# Patient Record
Sex: MALE | Race: WHITE | NOT HISPANIC OR LATINO
[De-identification: names, ages, dates, MRNs, and addresses within clinical notes are randomized per-mention and may not be internally consistent; named-entity substitution may affect disease eponyms.]

---

## 2017-01-13 ENCOUNTER — MEDICATION RENEWAL (OUTPATIENT)
Age: 72
End: 2017-01-13

## 2017-09-28 PROBLEM — N18.3 CKD (CHRONIC KIDNEY DISEASE), STAGE III: Status: RESOLVED | Noted: 2017-09-28 | Resolved: 2017-09-28

## 2017-09-28 PROBLEM — R35.1 NOCTURIA MORE THAN TWICE PER NIGHT: Status: RESOLVED | Noted: 2017-09-28 | Resolved: 2017-09-28

## 2017-09-28 PROBLEM — Z86.39 HISTORY OF HYPERLIPIDEMIA: Status: RESOLVED | Noted: 2017-09-28 | Resolved: 2017-09-28

## 2017-09-28 PROBLEM — Z95.5 PRESENCE OF DRUG COATED STENT IN RIGHT CORONARY ARTERY: Status: ACTIVE | Noted: 2017-09-28

## 2017-09-28 PROBLEM — Z82.49 FAMILY HISTORY OF CARDIAC DISORDER: Status: ACTIVE | Noted: 2017-09-28

## 2017-09-28 PROBLEM — Z87.891 FORMER SMOKER: Status: ACTIVE | Noted: 2017-09-28

## 2017-09-28 PROBLEM — Z87.19 HISTORY OF GASTROESOPHAGEAL REFLUX (GERD): Status: RESOLVED | Noted: 2017-09-28 | Resolved: 2017-09-28

## 2017-09-28 PROBLEM — Z80.9 FAMILY HISTORY OF MALIGNANT NEOPLASM: Status: ACTIVE | Noted: 2017-09-28

## 2017-10-02 ENCOUNTER — FORM ENCOUNTER (OUTPATIENT)
Age: 72
End: 2017-10-02

## 2017-10-03 ENCOUNTER — OUTPATIENT (OUTPATIENT)
Dept: OUTPATIENT SERVICES | Facility: HOSPITAL | Age: 72
LOS: 1 days | End: 2017-10-03
Payer: MEDICARE

## 2017-10-03 ENCOUNTER — APPOINTMENT (OUTPATIENT)
Dept: HEART AND VASCULAR | Facility: CLINIC | Age: 72
End: 2017-10-03
Payer: MEDICARE

## 2017-10-03 ENCOUNTER — APPOINTMENT (OUTPATIENT)
Dept: CARDIOTHORACIC SURGERY | Facility: CLINIC | Age: 72
End: 2017-10-03
Payer: MEDICARE

## 2017-10-03 VITALS — DIASTOLIC BLOOD PRESSURE: 76 MMHG | OXYGEN SATURATION: 97 % | SYSTOLIC BLOOD PRESSURE: 129 MMHG | HEART RATE: 65 BPM

## 2017-10-03 VITALS
HEART RATE: 65 BPM | HEIGHT: 71 IN | TEMPERATURE: 98 F | OXYGEN SATURATION: 97 % | RESPIRATION RATE: 16 BRPM | DIASTOLIC BLOOD PRESSURE: 76 MMHG | WEIGHT: 190.26 LBS | SYSTOLIC BLOOD PRESSURE: 129 MMHG

## 2017-10-03 VITALS
RESPIRATION RATE: 18 BRPM | TEMPERATURE: 98.1 F | BODY MASS INDEX: 26.6 KG/M2 | SYSTOLIC BLOOD PRESSURE: 125 MMHG | HEIGHT: 71 IN | DIASTOLIC BLOOD PRESSURE: 65 MMHG | OXYGEN SATURATION: 97 % | HEART RATE: 78 BPM | WEIGHT: 190 LBS

## 2017-10-03 DIAGNOSIS — N18.3 CHRONIC KIDNEY DISEASE, STAGE 3 (MODERATE): ICD-10-CM

## 2017-10-03 DIAGNOSIS — R35.1 NOCTURIA: ICD-10-CM

## 2017-10-03 DIAGNOSIS — T82.897A OTHER SPECIFIED COMPLICATION OF CARDIAC PROSTHETIC DEVICES, IMPLANTS AND GRAFTS, INITIAL ENCOUNTER: ICD-10-CM

## 2017-10-03 DIAGNOSIS — Z82.49 FAMILY HISTORY OF ISCHEMIC HEART DISEASE AND OTHER DISEASES OF THE CIRCULATORY SYSTEM: ICD-10-CM

## 2017-10-03 DIAGNOSIS — Z80.9 FAMILY HISTORY OF MALIGNANT NEOPLASM, UNSPECIFIED: ICD-10-CM

## 2017-10-03 DIAGNOSIS — Z87.891 PERSONAL HISTORY OF NICOTINE DEPENDENCE: ICD-10-CM

## 2017-10-03 DIAGNOSIS — Z87.19 PERSONAL HISTORY OF OTHER DISEASES OF THE DIGESTIVE SYSTEM: ICD-10-CM

## 2017-10-03 DIAGNOSIS — Z86.39 PERSONAL HISTORY OF OTHER ENDOCRINE, NUTRITIONAL AND METABOLIC DISEASE: ICD-10-CM

## 2017-10-03 DIAGNOSIS — Z95.5 PRESENCE OF CORONARY ANGIOPLASTY IMPLANT AND GRAFT: ICD-10-CM

## 2017-10-03 LAB
ALBUMIN SERPL ELPH-MCNC: 4.4 G/DL — SIGNIFICANT CHANGE UP (ref 3.3–5)
ALP SERPL-CCNC: 52 U/L — SIGNIFICANT CHANGE UP (ref 40–120)
ALT FLD-CCNC: 26 U/L — SIGNIFICANT CHANGE UP (ref 10–45)
ANION GAP SERPL CALC-SCNC: 14 MMOL/L — SIGNIFICANT CHANGE UP (ref 5–17)
APPEARANCE UR: CLEAR — SIGNIFICANT CHANGE UP
APTT BLD: 36.9 SEC — SIGNIFICANT CHANGE UP (ref 27.5–37.4)
AST SERPL-CCNC: 32 U/L — SIGNIFICANT CHANGE UP (ref 10–40)
BACTERIA # UR AUTO: PRESENT /HPF
BASOPHILS NFR BLD AUTO: 0.2 % — SIGNIFICANT CHANGE UP (ref 0–2)
BILIRUB SERPL-MCNC: 0.6 MG/DL — SIGNIFICANT CHANGE UP (ref 0.2–1.2)
BILIRUB UR-MCNC: NEGATIVE — SIGNIFICANT CHANGE UP
BUN SERPL-MCNC: 26 MG/DL — HIGH (ref 7–23)
CALCIUM SERPL-MCNC: 9.7 MG/DL — SIGNIFICANT CHANGE UP (ref 8.4–10.5)
CHLORIDE SERPL-SCNC: 104 MMOL/L — SIGNIFICANT CHANGE UP (ref 96–108)
CHOLEST SERPL-MCNC: 139 MG/DL — SIGNIFICANT CHANGE UP (ref 10–199)
CO2 SERPL-SCNC: 25 MMOL/L — SIGNIFICANT CHANGE UP (ref 22–31)
COLOR SPEC: YELLOW — SIGNIFICANT CHANGE UP
CREAT SERPL-MCNC: 1.15 MG/DL — SIGNIFICANT CHANGE UP (ref 0.5–1.3)
DIFF PNL FLD: (no result)
EOSINOPHIL NFR BLD AUTO: 1.5 % — SIGNIFICANT CHANGE UP (ref 0–6)
GLUCOSE SERPL-MCNC: 91 MG/DL — SIGNIFICANT CHANGE UP (ref 70–99)
GLUCOSE UR QL: NEGATIVE — SIGNIFICANT CHANGE UP
HBA1C BLD-MCNC: 5 % — SIGNIFICANT CHANGE UP (ref 4–5.6)
HBV SURFACE AG SER-ACNC: SIGNIFICANT CHANGE UP
HCT VFR BLD CALC: 40.3 % — SIGNIFICANT CHANGE UP (ref 39–50)
HDLC SERPL-MCNC: 53 MG/DL — SIGNIFICANT CHANGE UP (ref 40–125)
HGB BLD-MCNC: 13.2 G/DL — SIGNIFICANT CHANGE UP (ref 13–17)
INR BLD: 1.17 — HIGH (ref 0.88–1.16)
KETONES UR-MCNC: NEGATIVE — SIGNIFICANT CHANGE UP
LEUKOCYTE ESTERASE UR-ACNC: NEGATIVE — SIGNIFICANT CHANGE UP
LIPID PNL WITH DIRECT LDL SERPL: 64 MG/DL — SIGNIFICANT CHANGE UP
LYMPHOCYTES # BLD AUTO: 25.4 % — SIGNIFICANT CHANGE UP (ref 13–44)
MCHC RBC-ENTMCNC: 31.1 PG — SIGNIFICANT CHANGE UP (ref 27–34)
MCHC RBC-ENTMCNC: 32.8 G/DL — SIGNIFICANT CHANGE UP (ref 32–36)
MCV RBC AUTO: 95 FL — SIGNIFICANT CHANGE UP (ref 80–100)
MONOCYTES NFR BLD AUTO: 9.8 % — SIGNIFICANT CHANGE UP (ref 2–14)
NEUTROPHILS NFR BLD AUTO: 63.1 % — SIGNIFICANT CHANGE UP (ref 43–77)
NITRITE UR-MCNC: NEGATIVE — SIGNIFICANT CHANGE UP
PH UR: 5.5 — SIGNIFICANT CHANGE UP (ref 5–8)
PLATELET # BLD AUTO: 168 K/UL — SIGNIFICANT CHANGE UP (ref 150–400)
POTASSIUM SERPL-MCNC: 4.2 MMOL/L — SIGNIFICANT CHANGE UP (ref 3.5–5.3)
POTASSIUM SERPL-SCNC: 4.2 MMOL/L — SIGNIFICANT CHANGE UP (ref 3.5–5.3)
PROT SERPL-MCNC: 7.5 G/DL — SIGNIFICANT CHANGE UP (ref 6–8.3)
PROT UR-MCNC: NEGATIVE MG/DL — SIGNIFICANT CHANGE UP
PROTHROM AB SERPL-ACNC: 13 SEC — HIGH (ref 9.8–12.7)
RBC # BLD: 4.24 M/UL — SIGNIFICANT CHANGE UP (ref 4.2–5.8)
RBC # FLD: 13.7 % — SIGNIFICANT CHANGE UP (ref 10.3–16.9)
RBC CASTS # UR COMP ASSIST: (no result) /HPF
SODIUM SERPL-SCNC: 143 MMOL/L — SIGNIFICANT CHANGE UP (ref 135–145)
SP GR SPEC: 1.02 — SIGNIFICANT CHANGE UP (ref 1–1.03)
TOTAL CHOLESTEROL/HDL RATIO MEASUREMENT: 2.6 RATIO — LOW (ref 3.4–9.6)
TRIGL SERPL-MCNC: 108 MG/DL — SIGNIFICANT CHANGE UP (ref 10–149)
TSH SERPL-MCNC: 0.72 UIU/ML — SIGNIFICANT CHANGE UP (ref 0.35–4.94)
UROBILINOGEN FLD QL: 0.2 E.U./DL — SIGNIFICANT CHANGE UP
WBC # BLD: 5.9 K/UL — SIGNIFICANT CHANGE UP (ref 3.8–10.5)
WBC # FLD AUTO: 5.9 K/UL — SIGNIFICANT CHANGE UP (ref 3.8–10.5)
WBC UR QL: < 5 /HPF — SIGNIFICANT CHANGE UP

## 2017-10-03 PROCEDURE — 84443 ASSAY THYROID STIM HORMONE: CPT

## 2017-10-03 PROCEDURE — 93000 ELECTROCARDIOGRAM COMPLETE: CPT

## 2017-10-03 PROCEDURE — 71020: CPT | Mod: 26

## 2017-10-03 PROCEDURE — 85730 THROMBOPLASTIN TIME PARTIAL: CPT

## 2017-10-03 PROCEDURE — 86901 BLOOD TYPING SEROLOGIC RH(D): CPT

## 2017-10-03 PROCEDURE — 87340 HEPATITIS B SURFACE AG IA: CPT

## 2017-10-03 PROCEDURE — 86850 RBC ANTIBODY SCREEN: CPT

## 2017-10-03 PROCEDURE — 83036 HEMOGLOBIN GLYCOSYLATED A1C: CPT

## 2017-10-03 PROCEDURE — 81001 URINALYSIS AUTO W/SCOPE: CPT

## 2017-10-03 PROCEDURE — 80061 LIPID PANEL: CPT

## 2017-10-03 PROCEDURE — 85610 PROTHROMBIN TIME: CPT

## 2017-10-03 PROCEDURE — 86900 BLOOD TYPING SEROLOGIC ABO: CPT

## 2017-10-03 PROCEDURE — 85025 COMPLETE CBC W/AUTO DIFF WBC: CPT

## 2017-10-03 PROCEDURE — 99204 OFFICE O/P NEW MOD 45 MIN: CPT | Mod: 25

## 2017-10-03 PROCEDURE — 71046 X-RAY EXAM CHEST 2 VIEWS: CPT

## 2017-10-03 PROCEDURE — 80053 COMPREHEN METABOLIC PANEL: CPT

## 2017-10-03 PROCEDURE — 99205 OFFICE O/P NEW HI 60 MIN: CPT

## 2017-10-03 NOTE — H&P ADULT - PMH
CAD (Coronary Artery Disease)    Carpal Tunnel Syndrome  right and left  CKD (chronic kidney disease) stage 2, GFR 60-89 ml/min    GERD (gastroesophageal reflux disease)    HLD (hyperlipidemia)    HTN (Hypertension)    Prostate cancer    Stented coronary artery

## 2017-10-03 NOTE — H&P ADULT - HISTORY OF PRESENT ILLNESS
70 yo male with a history of HTN, HLD, prostate CA, CKD stage 2-3 and CAD s/p PCI to RCA (2006/2011) presented to his cardiologist with c/o severe chest discomfort and sob/dyspnea with activity. He described the left sided chest pain/pressure similar to prior to stent in 2011. In January 2017he had unrevealing nuclear stress test. 9/20/17 cardiac cath revealed patent RCA stents, distal left main stenosis, normal LV function. He is retired and lives in [place] and is referred to Dr. Parham by Vicente Uribe. He was seen in the outpatient office by Dr. Parham and now presents for elective surgery 72 yo male with a history of HTN, HLD, prostate CA, CKD stage 2-3 and CAD s/p PCI to RCA (2006/2011) presented to his cardiologist with c/o severe chest discomfort and sob/dyspnea with activity. He described the left sided chest pain/pressure similar to prior to stent in 2011. In January 2017he had unrevealing nuclear stress test. 9/20/17 cardiac cath revealed patent RCA stents, distal left main stenosis, normal LV function. He is retired and lives in [place] and is referred to Dr. Parham by Vicente Uribe. He was seen in the outpatient office by Dr. Parham and now presents for elective surgery.  On morning of surgery patient is in his usual state of health.

## 2017-10-03 NOTE — H&P ADULT - PSH
Bilateral Inguinal Hernia  repair 2005  Coronary Artery Stent Placement  6/26/06  [facility].  Drug eluting stent  RCA  S/P Carpal Tunnel Release  right and left 2007

## 2017-10-03 NOTE — H&P ADULT - NSHPLABSRESULTS_GEN_ALL_CORE
Hgb A1C =5.0  creat = 1.15  hct= 40.3  hgb=13.2  plt= 168  WBC= 5.9  INR= 1.17  tot alb= 4.4  tot bili= 0.6    TSH= 0.724    10/3/17 Chest xray: clear lungs    10/3/17 EKG: SB, 59 bpm    9/19/17 Cardiac Cath: widely patent RCA stents, distal left main stenosis, , EF 55%

## 2017-10-05 ENCOUNTER — APPOINTMENT (OUTPATIENT)
Dept: CARDIOTHORACIC SURGERY | Facility: HOSPITAL | Age: 72
End: 2017-10-05

## 2017-10-05 ENCOUNTER — INPATIENT (INPATIENT)
Facility: HOSPITAL | Age: 72
LOS: 4 days | Discharge: HOME HEALTH | DRG: 232 | End: 2017-10-10
Attending: THORACIC SURGERY (CARDIOTHORACIC VASCULAR SURGERY) | Admitting: THORACIC SURGERY (CARDIOTHORACIC VASCULAR SURGERY)
Payer: MEDICARE

## 2017-10-05 DIAGNOSIS — R33.9 RETENTION OF URINE, UNSPECIFIED: ICD-10-CM

## 2017-10-05 DIAGNOSIS — N20.0 CALCULUS OF KIDNEY: ICD-10-CM

## 2017-10-05 DIAGNOSIS — J90 PLEURAL EFFUSION, NOT ELSEWHERE CLASSIFIED: ICD-10-CM

## 2017-10-05 DIAGNOSIS — Z09 ENCOUNTER FOR FOLLOW-UP EXAMINATION AFTER COMPLETED TREATMENT FOR CONDITIONS OTHER THAN MALIGNANT NEOPLASM: ICD-10-CM

## 2017-10-05 DIAGNOSIS — R31.29 OTHER MICROSCOPIC HEMATURIA: ICD-10-CM

## 2017-10-05 DIAGNOSIS — Z98.61 CORONARY ANGIOPLASTY STATUS: ICD-10-CM

## 2017-10-05 DIAGNOSIS — Z95.1 PRESENCE OF AORTOCORONARY BYPASS GRAFT: ICD-10-CM

## 2017-10-05 DIAGNOSIS — Z87.898 PERSONAL HISTORY OF OTHER SPECIFIED CONDITIONS: ICD-10-CM

## 2017-10-05 DIAGNOSIS — Z85.46 PERSONAL HISTORY OF MALIGNANT NEOPLASM OF PROSTATE: ICD-10-CM

## 2017-10-05 LAB
ALBUMIN SERPL ELPH-MCNC: 3.8 G/DL — SIGNIFICANT CHANGE UP (ref 3.3–5)
ALBUMIN SERPL ELPH-MCNC: 4.2 G/DL — SIGNIFICANT CHANGE UP (ref 3.3–5)
ALP SERPL-CCNC: 45 U/L — SIGNIFICANT CHANGE UP (ref 40–120)
ALP SERPL-CCNC: 49 U/L — SIGNIFICANT CHANGE UP (ref 40–120)
ALT FLD-CCNC: 23 U/L — SIGNIFICANT CHANGE UP (ref 10–45)
ALT FLD-CCNC: 26 U/L — SIGNIFICANT CHANGE UP (ref 10–45)
ANION GAP SERPL CALC-SCNC: 12 MMOL/L — SIGNIFICANT CHANGE UP (ref 5–17)
ANION GAP SERPL CALC-SCNC: 14 MMOL/L — SIGNIFICANT CHANGE UP (ref 5–17)
ANION GAP SERPL CALC-SCNC: 16 MMOL/L — SIGNIFICANT CHANGE UP (ref 5–17)
APTT BLD: 26.6 SEC — LOW (ref 27.5–37.4)
APTT BLD: 28 SEC — SIGNIFICANT CHANGE UP (ref 27.5–37.4)
APTT BLD: 30.1 SEC — SIGNIFICANT CHANGE UP (ref 27.5–37.4)
AST SERPL-CCNC: 34 U/L — SIGNIFICANT CHANGE UP (ref 10–40)
AST SERPL-CCNC: 36 U/L — SIGNIFICANT CHANGE UP (ref 10–40)
BASE EXCESS BLDA CALC-SCNC: -1.3 MMOL/L — SIGNIFICANT CHANGE UP (ref -2–3)
BASE EXCESS BLDA CALC-SCNC: -1.4 MMOL/L — SIGNIFICANT CHANGE UP (ref -2–3)
BASE EXCESS BLDV CALC-SCNC: 3 MMOL/L — SIGNIFICANT CHANGE UP
BASOPHILS NFR BLD AUTO: 0 % — SIGNIFICANT CHANGE UP (ref 0–2)
BILIRUB DIRECT SERPL-MCNC: <0.2 MG/DL — SIGNIFICANT CHANGE UP (ref 0–0.2)
BILIRUB INDIRECT FLD-MCNC: >0.4 MG/DL — SIGNIFICANT CHANGE UP (ref 0.2–1)
BILIRUB SERPL-MCNC: 0.5 MG/DL — SIGNIFICANT CHANGE UP (ref 0.2–1.2)
BILIRUB SERPL-MCNC: 0.6 MG/DL — SIGNIFICANT CHANGE UP (ref 0.2–1.2)
BLD GP AB SCN SERPL QL: NEGATIVE — SIGNIFICANT CHANGE UP
BUN SERPL-MCNC: 12 MG/DL — SIGNIFICANT CHANGE UP (ref 7–23)
BUN SERPL-MCNC: 14 MG/DL — SIGNIFICANT CHANGE UP (ref 7–23)
BUN SERPL-MCNC: 16 MG/DL — SIGNIFICANT CHANGE UP (ref 7–23)
CA-I BLDA-SCNC: 1.18 MMOL/L — SIGNIFICANT CHANGE UP (ref 1.12–1.3)
CALCIUM SERPL-MCNC: 8.8 MG/DL — SIGNIFICANT CHANGE UP (ref 8.4–10.5)
CALCIUM SERPL-MCNC: 9.3 MG/DL — SIGNIFICANT CHANGE UP (ref 8.4–10.5)
CALCIUM SERPL-MCNC: 9.9 MG/DL — SIGNIFICANT CHANGE UP (ref 8.4–10.5)
CHLORIDE SERPL-SCNC: 102 MMOL/L — SIGNIFICANT CHANGE UP (ref 96–108)
CHLORIDE SERPL-SCNC: 98 MMOL/L — SIGNIFICANT CHANGE UP (ref 96–108)
CHLORIDE SERPL-SCNC: 99 MMOL/L — SIGNIFICANT CHANGE UP (ref 96–108)
CO2 SERPL-SCNC: 23 MMOL/L — SIGNIFICANT CHANGE UP (ref 22–31)
CO2 SERPL-SCNC: 23 MMOL/L — SIGNIFICANT CHANGE UP (ref 22–31)
CO2 SERPL-SCNC: 24 MMOL/L — SIGNIFICANT CHANGE UP (ref 22–31)
COHGB MFR BLDA: 1 % — SIGNIFICANT CHANGE UP
CREAT SERPL-MCNC: 0.83 MG/DL — SIGNIFICANT CHANGE UP (ref 0.5–1.3)
CREAT SERPL-MCNC: 0.85 MG/DL — SIGNIFICANT CHANGE UP (ref 0.5–1.3)
CREAT SERPL-MCNC: 0.96 MG/DL — SIGNIFICANT CHANGE UP (ref 0.5–1.3)
EOSINOPHIL NFR BLD AUTO: 0.9 % — SIGNIFICANT CHANGE UP (ref 0–6)
GAS PNL BLDA: SIGNIFICANT CHANGE UP
GAS PNL BLDV: SIGNIFICANT CHANGE UP
GLUCOSE SERPL-MCNC: 123 MG/DL — HIGH (ref 70–99)
GLUCOSE SERPL-MCNC: 155 MG/DL — HIGH (ref 70–99)
GLUCOSE SERPL-MCNC: 184 MG/DL — HIGH (ref 70–99)
HCO3 BLDA-SCNC: 24 MMOL/L — SIGNIFICANT CHANGE UP (ref 21–28)
HCO3 BLDA-SCNC: 24 MMOL/L — SIGNIFICANT CHANGE UP (ref 21–28)
HCO3 BLDV-SCNC: 28 MMOL/L — HIGH (ref 20–27)
HCT VFR BLD CALC: 37 % — LOW (ref 39–50)
HCT VFR BLD CALC: 40.1 % — SIGNIFICANT CHANGE UP (ref 39–50)
HCT VFR BLD CALC: 40.1 % — SIGNIFICANT CHANGE UP (ref 39–50)
HGB BLD-MCNC: 12.8 G/DL — LOW (ref 13–17)
HGB BLD-MCNC: 13.5 G/DL — SIGNIFICANT CHANGE UP (ref 13–17)
HGB BLD-MCNC: 13.5 G/DL — SIGNIFICANT CHANGE UP (ref 13–17)
HGB BLDA-MCNC: 13.1 G/DL — SIGNIFICANT CHANGE UP (ref 13–17)
INR BLD: 1.14 — SIGNIFICANT CHANGE UP (ref 0.88–1.16)
INR BLD: 1.14 — SIGNIFICANT CHANGE UP (ref 0.88–1.16)
INR BLD: 1.26 — HIGH (ref 0.88–1.16)
LACTATE SERPL-SCNC: 1.2 MMOL/L — SIGNIFICANT CHANGE UP (ref 0.5–2)
LYMPHOCYTES # BLD AUTO: 13 % — SIGNIFICANT CHANGE UP (ref 13–44)
MAGNESIUM SERPL-MCNC: 1.7 MG/DL — SIGNIFICANT CHANGE UP (ref 1.6–2.6)
MAGNESIUM SERPL-MCNC: 1.8 MG/DL — SIGNIFICANT CHANGE UP (ref 1.6–2.6)
MCHC RBC-ENTMCNC: 31.2 PG — SIGNIFICANT CHANGE UP (ref 27–34)
MCHC RBC-ENTMCNC: 31.5 PG — SIGNIFICANT CHANGE UP (ref 27–34)
MCHC RBC-ENTMCNC: 31.9 PG — SIGNIFICANT CHANGE UP (ref 27–34)
MCHC RBC-ENTMCNC: 33.7 G/DL — SIGNIFICANT CHANGE UP (ref 32–36)
MCHC RBC-ENTMCNC: 33.7 G/DL — SIGNIFICANT CHANGE UP (ref 32–36)
MCHC RBC-ENTMCNC: 34.6 G/DL — SIGNIFICANT CHANGE UP (ref 32–36)
MCV RBC AUTO: 92.3 FL — SIGNIFICANT CHANGE UP (ref 80–100)
MCV RBC AUTO: 92.6 FL — SIGNIFICANT CHANGE UP (ref 80–100)
MCV RBC AUTO: 93.5 FL — SIGNIFICANT CHANGE UP (ref 80–100)
METHGB MFR BLDA: 0.4 % — SIGNIFICANT CHANGE UP
MONOCYTES NFR BLD AUTO: 5.3 % — SIGNIFICANT CHANGE UP (ref 2–14)
NEUTROPHILS NFR BLD AUTO: 80.8 % — HIGH (ref 43–77)
O2 CT VFR BLDA CALC: 19 ML/DL — SIGNIFICANT CHANGE UP (ref 15–23)
OXYHGB MFR BLDA: 98 % — SIGNIFICANT CHANGE UP (ref 94–100)
PCO2 BLDA: 41 MMHG — SIGNIFICANT CHANGE UP (ref 35–48)
PCO2 BLDA: 44 MMHG — SIGNIFICANT CHANGE UP (ref 35–48)
PCO2 BLDV: 46 MMHG — SIGNIFICANT CHANGE UP (ref 41–51)
PH BLDA: 7.36 — SIGNIFICANT CHANGE UP (ref 7.35–7.45)
PH BLDA: 7.38 — SIGNIFICANT CHANGE UP (ref 7.35–7.45)
PH BLDV: 7.41 — SIGNIFICANT CHANGE UP (ref 7.32–7.43)
PHOSPHATE SERPL-MCNC: 2.8 MG/DL — SIGNIFICANT CHANGE UP (ref 2.5–4.5)
PHOSPHATE SERPL-MCNC: 2.9 MG/DL — SIGNIFICANT CHANGE UP (ref 2.5–4.5)
PLATELET # BLD AUTO: 137 K/UL — LOW (ref 150–400)
PLATELET # BLD AUTO: 167 K/UL — SIGNIFICANT CHANGE UP (ref 150–400)
PLATELET # BLD AUTO: 168 K/UL — SIGNIFICANT CHANGE UP (ref 150–400)
PO2 BLDA: 161 MMHG — HIGH (ref 83–108)
PO2 BLDA: 369 MMHG — HIGH (ref 83–108)
PO2 BLDV: 22 MMHG — SIGNIFICANT CHANGE UP
POTASSIUM BLDA-SCNC: 3.7 MMOL/L — SIGNIFICANT CHANGE UP (ref 3.5–4.9)
POTASSIUM SERPL-MCNC: 3.8 MMOL/L — SIGNIFICANT CHANGE UP (ref 3.5–5.3)
POTASSIUM SERPL-MCNC: 4 MMOL/L — SIGNIFICANT CHANGE UP (ref 3.5–5.3)
POTASSIUM SERPL-MCNC: 4.4 MMOL/L — SIGNIFICANT CHANGE UP (ref 3.5–5.3)
POTASSIUM SERPL-SCNC: 3.8 MMOL/L — SIGNIFICANT CHANGE UP (ref 3.5–5.3)
POTASSIUM SERPL-SCNC: 4 MMOL/L — SIGNIFICANT CHANGE UP (ref 3.5–5.3)
POTASSIUM SERPL-SCNC: 4.4 MMOL/L — SIGNIFICANT CHANGE UP (ref 3.5–5.3)
PROT SERPL-MCNC: 7.1 G/DL — SIGNIFICANT CHANGE UP (ref 6–8.3)
PROT SERPL-MCNC: 7.2 G/DL — SIGNIFICANT CHANGE UP (ref 6–8.3)
PROTHROM AB SERPL-ACNC: 12.7 SEC — SIGNIFICANT CHANGE UP (ref 9.8–12.7)
PROTHROM AB SERPL-ACNC: 12.7 SEC — SIGNIFICANT CHANGE UP (ref 9.8–12.7)
PROTHROM AB SERPL-ACNC: 14 SEC — HIGH (ref 9.8–12.7)
RBC # BLD: 4.01 M/UL — LOW (ref 4.2–5.8)
RBC # BLD: 4.29 M/UL — SIGNIFICANT CHANGE UP (ref 4.2–5.8)
RBC # BLD: 4.33 M/UL — SIGNIFICANT CHANGE UP (ref 4.2–5.8)
RBC # FLD: 13.4 % — SIGNIFICANT CHANGE UP (ref 10.3–16.9)
RBC # FLD: 13.5 % — SIGNIFICANT CHANGE UP (ref 10.3–16.9)
RBC # FLD: 13.6 % — SIGNIFICANT CHANGE UP (ref 10.3–16.9)
RH IG SCN BLD-IMP: POSITIVE — SIGNIFICANT CHANGE UP
SAO2 % BLDA: 100 % — SIGNIFICANT CHANGE UP (ref 95–100)
SAO2 % BLDA: 99 % — SIGNIFICANT CHANGE UP (ref 95–100)
SAO2 % BLDV: 34 % — SIGNIFICANT CHANGE UP
SODIUM BLDA-SCNC: 143 MMOL/L — SIGNIFICANT CHANGE UP (ref 138–146)
SODIUM SERPL-SCNC: 135 MMOL/L — SIGNIFICANT CHANGE UP (ref 135–145)
SODIUM SERPL-SCNC: 138 MMOL/L — SIGNIFICANT CHANGE UP (ref 135–145)
SODIUM SERPL-SCNC: 138 MMOL/L — SIGNIFICANT CHANGE UP (ref 135–145)
WBC # BLD: 12.6 K/UL — HIGH (ref 3.8–10.5)
WBC # BLD: 12.7 K/UL — HIGH (ref 3.8–10.5)
WBC # BLD: 9.8 K/UL — SIGNIFICANT CHANGE UP (ref 3.8–10.5)
WBC # FLD AUTO: 12.6 K/UL — HIGH (ref 3.8–10.5)
WBC # FLD AUTO: 12.7 K/UL — HIGH (ref 3.8–10.5)
WBC # FLD AUTO: 9.8 K/UL — SIGNIFICANT CHANGE UP (ref 3.8–10.5)

## 2017-10-05 PROCEDURE — 76998 US GUIDE INTRAOP: CPT | Mod: 26,59

## 2017-10-05 PROCEDURE — 33533 CABG ARTERIAL SINGLE: CPT | Mod: AS

## 2017-10-05 PROCEDURE — 99291 CRITICAL CARE FIRST HOUR: CPT

## 2017-10-05 PROCEDURE — 76998 US GUIDE INTRAOP: CPT | Mod: 26,AS

## 2017-10-05 PROCEDURE — 33533 CABG ARTERIAL SINGLE: CPT

## 2017-10-05 PROCEDURE — 93010 ELECTROCARDIOGRAM REPORT: CPT

## 2017-10-05 PROCEDURE — 71010: CPT | Mod: 26

## 2017-10-05 RX ORDER — LIDOCAINE 4 G/100G
1 CREAM TOPICAL DAILY
Qty: 0 | Refills: 0 | Status: DISCONTINUED | OUTPATIENT
Start: 2017-10-05 | End: 2017-10-10

## 2017-10-05 RX ORDER — MEPERIDINE HYDROCHLORIDE 50 MG/ML
25 INJECTION INTRAMUSCULAR; INTRAVENOUS; SUBCUTANEOUS ONCE
Qty: 0 | Refills: 0 | Status: DISCONTINUED | OUTPATIENT
Start: 2017-10-05 | End: 2017-10-06

## 2017-10-05 RX ORDER — POTASSIUM CHLORIDE 20 MEQ
10 PACKET (EA) ORAL
Qty: 0 | Refills: 0 | Status: DISCONTINUED | OUTPATIENT
Start: 2017-10-05 | End: 2017-10-05

## 2017-10-05 RX ORDER — INSULIN HUMAN 100 [IU]/ML
1 INJECTION, SOLUTION SUBCUTANEOUS
Qty: 250 | Refills: 0 | Status: DISCONTINUED | OUTPATIENT
Start: 2017-10-05 | End: 2017-10-05

## 2017-10-05 RX ORDER — POTASSIUM CHLORIDE 20 MEQ
10 PACKET (EA) ORAL ONCE
Qty: 0 | Refills: 0 | Status: DISCONTINUED | OUTPATIENT
Start: 2017-10-05 | End: 2017-10-05

## 2017-10-05 RX ORDER — DEXTROSE 50 % IN WATER 50 %
25 SYRINGE (ML) INTRAVENOUS ONCE
Qty: 0 | Refills: 0 | Status: DISCONTINUED | OUTPATIENT
Start: 2017-10-05 | End: 2017-10-07

## 2017-10-05 RX ORDER — POTASSIUM CHLORIDE 20 MEQ
20 PACKET (EA) ORAL ONCE
Qty: 0 | Refills: 0 | Status: COMPLETED | OUTPATIENT
Start: 2017-10-05 | End: 2017-10-05

## 2017-10-05 RX ORDER — LABETALOL HCL 100 MG
10 TABLET ORAL ONCE
Qty: 0 | Refills: 0 | Status: DISCONTINUED | OUTPATIENT
Start: 2017-10-05 | End: 2017-10-06

## 2017-10-05 RX ORDER — FAMOTIDINE 10 MG/ML
20 INJECTION INTRAVENOUS
Qty: 0 | Refills: 0 | Status: DISCONTINUED | OUTPATIENT
Start: 2017-10-05 | End: 2017-10-10

## 2017-10-05 RX ORDER — METOPROLOL TARTRATE 50 MG
25 TABLET ORAL EVERY 6 HOURS
Qty: 0 | Refills: 0 | Status: DISCONTINUED | OUTPATIENT
Start: 2017-10-05 | End: 2017-10-10

## 2017-10-05 RX ORDER — FENTANYL CITRATE 50 UG/ML
12.5 INJECTION INTRAVENOUS ONCE
Qty: 0 | Refills: 0 | Status: DISCONTINUED | OUTPATIENT
Start: 2017-10-05 | End: 2017-10-05

## 2017-10-05 RX ORDER — ASPIRIN/CALCIUM CARB/MAGNESIUM 324 MG
81 TABLET ORAL DAILY
Qty: 0 | Refills: 0 | Status: DISCONTINUED | OUTPATIENT
Start: 2017-10-05 | End: 2017-10-10

## 2017-10-05 RX ORDER — DEXTROSE 50 % IN WATER 50 %
1 SYRINGE (ML) INTRAVENOUS ONCE
Qty: 0 | Refills: 0 | Status: DISCONTINUED | OUTPATIENT
Start: 2017-10-05 | End: 2017-10-07

## 2017-10-05 RX ORDER — DOCUSATE SODIUM 100 MG
100 CAPSULE ORAL
Qty: 0 | Refills: 0 | Status: DISCONTINUED | OUTPATIENT
Start: 2017-10-05 | End: 2017-10-10

## 2017-10-05 RX ORDER — BUPIVACAINE 13.3 MG/ML
20 INJECTION, SUSPENSION, LIPOSOMAL INFILTRATION ONCE
Qty: 0 | Refills: 0 | Status: DISCONTINUED | OUTPATIENT
Start: 2017-10-05 | End: 2017-10-06

## 2017-10-05 RX ORDER — INSULIN LISPRO 100/ML
VIAL (ML) SUBCUTANEOUS
Qty: 0 | Refills: 0 | Status: DISCONTINUED | OUTPATIENT
Start: 2017-10-05 | End: 2017-10-06

## 2017-10-05 RX ORDER — SODIUM CHLORIDE 9 MG/ML
1000 INJECTION, SOLUTION INTRAVENOUS ONCE
Qty: 0 | Refills: 0 | Status: COMPLETED | OUTPATIENT
Start: 2017-10-05 | End: 2017-10-06

## 2017-10-05 RX ORDER — ALBUMIN HUMAN 25 %
250 VIAL (ML) INTRAVENOUS ONCE
Qty: 0 | Refills: 0 | Status: COMPLETED | OUTPATIENT
Start: 2017-10-05 | End: 2017-10-05

## 2017-10-05 RX ORDER — FINASTERIDE 5 MG/1
5 TABLET, FILM COATED ORAL DAILY
Qty: 0 | Refills: 0 | Status: DISCONTINUED | OUTPATIENT
Start: 2017-10-05 | End: 2017-10-10

## 2017-10-05 RX ORDER — OXYCODONE AND ACETAMINOPHEN 5; 325 MG/1; MG/1
1 TABLET ORAL EVERY 4 HOURS
Qty: 0 | Refills: 0 | Status: DISCONTINUED | OUTPATIENT
Start: 2017-10-05 | End: 2017-10-10

## 2017-10-05 RX ORDER — CEFAZOLIN SODIUM 1 G
2000 VIAL (EA) INJECTION EVERY 8 HOURS
Qty: 0 | Refills: 0 | Status: COMPLETED | OUTPATIENT
Start: 2017-10-05 | End: 2017-10-06

## 2017-10-05 RX ORDER — SODIUM CHLORIDE 9 MG/ML
1000 INJECTION, SOLUTION INTRAVENOUS
Qty: 0 | Refills: 0 | Status: DISCONTINUED | OUTPATIENT
Start: 2017-10-05 | End: 2017-10-06

## 2017-10-05 RX ORDER — CALCIUM GLUCONATE 100 MG/ML
2 VIAL (ML) INTRAVENOUS ONCE
Qty: 0 | Refills: 0 | Status: COMPLETED | OUTPATIENT
Start: 2017-10-05 | End: 2017-10-05

## 2017-10-05 RX ORDER — FAMOTIDINE 10 MG/ML
20 INJECTION INTRAVENOUS ONCE
Qty: 0 | Refills: 0 | Status: COMPLETED | OUTPATIENT
Start: 2017-10-05 | End: 2017-10-05

## 2017-10-05 RX ORDER — ATORVASTATIN CALCIUM 80 MG/1
10 TABLET, FILM COATED ORAL AT BEDTIME
Qty: 0 | Refills: 0 | Status: DISCONTINUED | OUTPATIENT
Start: 2017-10-05 | End: 2017-10-10

## 2017-10-05 RX ORDER — HEPARIN SODIUM 5000 [USP'U]/ML
5000 INJECTION INTRAVENOUS; SUBCUTANEOUS EVERY 8 HOURS
Qty: 0 | Refills: 0 | Status: DISCONTINUED | OUTPATIENT
Start: 2017-10-05 | End: 2017-10-10

## 2017-10-05 RX ORDER — CLOPIDOGREL BISULFATE 75 MG/1
75 TABLET, FILM COATED ORAL DAILY
Qty: 0 | Refills: 0 | Status: DISCONTINUED | OUTPATIENT
Start: 2017-10-05 | End: 2017-10-10

## 2017-10-05 RX ORDER — GLUCAGON INJECTION, SOLUTION 0.5 MG/.1ML
1 INJECTION, SOLUTION SUBCUTANEOUS ONCE
Qty: 0 | Refills: 0 | Status: DISCONTINUED | OUTPATIENT
Start: 2017-10-05 | End: 2017-10-07

## 2017-10-05 RX ORDER — ACETAMINOPHEN 500 MG
1000 TABLET ORAL ONCE
Qty: 0 | Refills: 0 | Status: COMPLETED | OUTPATIENT
Start: 2017-10-05 | End: 2017-10-05

## 2017-10-05 RX ORDER — DEXTROSE 50 % IN WATER 50 %
12.5 SYRINGE (ML) INTRAVENOUS ONCE
Qty: 0 | Refills: 0 | Status: DISCONTINUED | OUTPATIENT
Start: 2017-10-05 | End: 2017-10-07

## 2017-10-05 RX ORDER — SENNA PLUS 8.6 MG/1
2 TABLET ORAL AT BEDTIME
Qty: 0 | Refills: 0 | Status: DISCONTINUED | OUTPATIENT
Start: 2017-10-05 | End: 2017-10-10

## 2017-10-05 RX ORDER — SODIUM CHLORIDE 9 MG/ML
1000 INJECTION, SOLUTION INTRAVENOUS
Qty: 0 | Refills: 0 | Status: DISCONTINUED | OUTPATIENT
Start: 2017-10-05 | End: 2017-10-07

## 2017-10-05 RX ORDER — CLEVIDIPINE BUTYRATE 50MG/100ML
1 VIAL (ML) INTRAVENOUS
Qty: 25 | Refills: 0 | Status: DISCONTINUED | OUTPATIENT
Start: 2017-10-05 | End: 2017-10-06

## 2017-10-05 RX ORDER — MAGNESIUM SULFATE 500 MG/ML
2 VIAL (ML) INJECTION ONCE
Qty: 0 | Refills: 0 | Status: COMPLETED | OUTPATIENT
Start: 2017-10-05 | End: 2017-10-05

## 2017-10-05 RX ADMIN — FENTANYL CITRATE 12.5 MICROGRAM(S): 50 INJECTION INTRAVENOUS at 14:30

## 2017-10-05 RX ADMIN — Medication 50 GRAM(S): at 18:18

## 2017-10-05 RX ADMIN — Medication 2 MG/HR: at 15:58

## 2017-10-05 RX ADMIN — CLOPIDOGREL BISULFATE 75 MILLIGRAM(S): 75 TABLET, FILM COATED ORAL at 21:33

## 2017-10-05 RX ADMIN — Medication 1: at 16:53

## 2017-10-05 RX ADMIN — Medication 100 MILLIGRAM(S): at 21:33

## 2017-10-05 RX ADMIN — Medication 2 MG/HR: at 20:42

## 2017-10-05 RX ADMIN — FAMOTIDINE 20 MILLIGRAM(S): 10 INJECTION INTRAVENOUS at 22:35

## 2017-10-05 RX ADMIN — Medication 125 MILLILITER(S): at 23:46

## 2017-10-05 RX ADMIN — Medication 2 MG/HR: at 13:17

## 2017-10-05 RX ADMIN — Medication 100 MILLIEQUIVALENT(S): at 18:17

## 2017-10-05 RX ADMIN — Medication 100 MILLIGRAM(S): at 15:05

## 2017-10-05 RX ADMIN — Medication 81 MILLIGRAM(S): at 21:32

## 2017-10-05 RX ADMIN — ATORVASTATIN CALCIUM 10 MILLIGRAM(S): 80 TABLET, FILM COATED ORAL at 22:36

## 2017-10-05 RX ADMIN — Medication 1: at 22:17

## 2017-10-05 RX ADMIN — Medication 400 GRAM(S): at 15:58

## 2017-10-05 RX ADMIN — Medication 100 MILLIGRAM(S): at 23:46

## 2017-10-05 RX ADMIN — FINASTERIDE 5 MILLIGRAM(S): 5 TABLET, FILM COATED ORAL at 22:35

## 2017-10-05 RX ADMIN — Medication 400 MILLIGRAM(S): at 22:08

## 2017-10-05 RX ADMIN — FENTANYL CITRATE 12.5 MICROGRAM(S): 50 INJECTION INTRAVENOUS at 13:54

## 2017-10-05 RX ADMIN — Medication 1000 MILLIGRAM(S): at 22:40

## 2017-10-05 RX ADMIN — Medication 125 MILLILITER(S): at 22:36

## 2017-10-05 RX ADMIN — LIDOCAINE 1 PATCH: 4 CREAM TOPICAL at 13:17

## 2017-10-05 RX ADMIN — FAMOTIDINE 20 MILLIGRAM(S): 10 INJECTION INTRAVENOUS at 15:03

## 2017-10-05 RX ADMIN — HEPARIN SODIUM 5000 UNIT(S): 5000 INJECTION INTRAVENOUS; SUBCUTANEOUS at 21:32

## 2017-10-05 RX ADMIN — SENNA PLUS 2 TABLET(S): 8.6 TABLET ORAL at 21:33

## 2017-10-05 NOTE — BRIEF OPERATIVE NOTE - PROCEDURE
<<-----Click on this checkbox to enter Procedure GWENDOLYN, robot assisted  10/05/2017    Active  EMERY

## 2017-10-05 NOTE — PROGRESS NOTE ADULT - SUBJECTIVE AND OBJECTIVE BOX
INTERVAL HPI/OVERNIGHT EVENTS:    OP Day: [facility]   EF normal     70yo male with Hx HTN, HLD, prostate CA, CKD II-III, CAD/PCI to RCA ('06/'11) presenting with CP/SOB/[place]    NST 1/2017 (-)  Cath: patent RCA stents, distal left main stenosis, normal LV function    To OR today - [facility] - no blood products; 2L crystalloid/750cc UO - no infusions on arrival    patient awake/alert and interactive - reporting incisional pain  HTN on arrival - cleviprex started and fentanyl given by anesthesia    PMHx includes but is not limited to:  Prostate cancer  HLD (hyperlipidemia)  GERD  CKD II - III  CAD./stents  HTN  Carpal Tunnel Syndrome: right and left  S/P Carpal Tunnel Release: right and left 2007  Bilateral Inguinal Hernia: repair 2005    ICU Vital Signs Last 24 Hrs  T(C): 36.4 (05 Oct 2017 11:45), Max: 36.4 (05 Oct 2017 11:45)  T(F): 97.6 (05 Oct 2017 11:45), Max: 97.6 (05 Oct 2017 11:45)  HR: 78 (05 Oct 2017 13:30) (74 - 88) sinus  ABP: 134/58 (05 Oct 2017 13:30) (134/56 - 168/72)  ABP(mean): 84 (05 Oct 2017 13:30) (82 - 106)  RR: 25 (05 Oct 2017 13:30) (15 - 25)  SpO2: 95% (05 Oct 2017 13:30) (95% - 98%)    Qtts: cleviprex    I&O's Summary    05 Oct 2017 07:01  -  05 Oct 2017 13:43  --------------------------------------------------------  IN: 0 mL / OUT: 570 mL / NET: -570 mL    Physical Exam    Heart - regular (-)rub/gallop  Lungs - CTA anterior (-)rub/gallop  Abd - (+)BS soft (-)r/r/g  Ext - warm to touch ; 2+ peripheral palpable pulses  Chest - incision site clean/dry - CT in place  Neuro - alert/oriented and interactive; moving all extremities and non-focal  Skin - no rash    LABS:                        12.8   9.8   )-----------( 137      ( 05 Oct 2017 11:47 )             37.0     10-05    138  |  102  |  16  ----------------------------<  123<H>  4.0   |  24  |  0.96    Ca    8.8      05 Oct 2017 11:47    TPro  7.5  /  Alb  4.4  /  TBili  0.6  /  DBili  x   /  AST  32  /  ALT  26  /  AlkPhos  52  10-03    PT/INR - ( 05 Oct 2017 11:47 )   PT: 14.0 sec;   INR: 1.26     PTT - ( 05 Oct 2017 11:47 )  PTT:28.0 sec    ABG - ( 05 Oct 2017 11:46 ) 7.38/41/161/99    RADIOLOGY & ADDITIONAL STUDIES: reviewed    Patient with Known CAD/anginal sxs now s/p OPCAB x 1 - reportly plan for PCI monday    1. CV  hemodynamically stable  hypertensive on arrival - cleviprex started to maintain SBP   pain management  ASA/plavix  complete periop Abx prophylaxis  anticipate start Metoprolol once able to take po  timing of staged intervention per cardiology    2. Pulm   arrived to ICU extubated  monitor CT output  anticipate OOB - ambulation and incentive spirometry later today    3. Endocrine  maintain glycemic control   non diabetic - HgA1c 5; ISS    pain management  bowel regimen  anticipate start diet once further from anesthesia and pass bedside swallow assessment    DVT and GI prophylaxis    d/w patient/family present in room; staff; anesthesia and CTS  I have spent/provided stated minutes of critical care time to this patient: 60 min

## 2017-10-06 LAB
ANION GAP SERPL CALC-SCNC: 10 MMOL/L — SIGNIFICANT CHANGE UP (ref 5–17)
ANION GAP SERPL CALC-SCNC: 11 MMOL/L — SIGNIFICANT CHANGE UP (ref 5–17)
ANION GAP SERPL CALC-SCNC: 13 MMOL/L — SIGNIFICANT CHANGE UP (ref 5–17)
ANION GAP SERPL CALC-SCNC: 15 MMOL/L — SIGNIFICANT CHANGE UP (ref 5–17)
APTT BLD: 26.4 SEC — LOW (ref 27.5–37.4)
BASE EXCESS BLDA CALC-SCNC: 2.7 MMOL/L — SIGNIFICANT CHANGE UP (ref -2–3)
BUN SERPL-MCNC: 12 MG/DL — SIGNIFICANT CHANGE UP (ref 7–23)
BUN SERPL-MCNC: 12 MG/DL — SIGNIFICANT CHANGE UP (ref 7–23)
BUN SERPL-MCNC: 13 MG/DL — SIGNIFICANT CHANGE UP (ref 7–23)
BUN SERPL-MCNC: 13 MG/DL — SIGNIFICANT CHANGE UP (ref 7–23)
CALCIUM SERPL-MCNC: 9.1 MG/DL — SIGNIFICANT CHANGE UP (ref 8.4–10.5)
CALCIUM SERPL-MCNC: 9.3 MG/DL — SIGNIFICANT CHANGE UP (ref 8.4–10.5)
CALCIUM SERPL-MCNC: 9.4 MG/DL — SIGNIFICANT CHANGE UP (ref 8.4–10.5)
CALCIUM SERPL-MCNC: 9.8 MG/DL — SIGNIFICANT CHANGE UP (ref 8.4–10.5)
CHLORIDE SERPL-SCNC: 100 MMOL/L — SIGNIFICANT CHANGE UP (ref 96–108)
CHLORIDE SERPL-SCNC: 101 MMOL/L — SIGNIFICANT CHANGE UP (ref 96–108)
CHLORIDE SERPL-SCNC: 101 MMOL/L — SIGNIFICANT CHANGE UP (ref 96–108)
CHLORIDE SERPL-SCNC: 103 MMOL/L — SIGNIFICANT CHANGE UP (ref 96–108)
CO2 SERPL-SCNC: 25 MMOL/L — SIGNIFICANT CHANGE UP (ref 22–31)
CO2 SERPL-SCNC: 26 MMOL/L — SIGNIFICANT CHANGE UP (ref 22–31)
CO2 SERPL-SCNC: 28 MMOL/L — SIGNIFICANT CHANGE UP (ref 22–31)
CO2 SERPL-SCNC: 29 MMOL/L — SIGNIFICANT CHANGE UP (ref 22–31)
CREAT SERPL-MCNC: 0.9 MG/DL — SIGNIFICANT CHANGE UP (ref 0.5–1.3)
CREAT SERPL-MCNC: 0.91 MG/DL — SIGNIFICANT CHANGE UP (ref 0.5–1.3)
CREAT SERPL-MCNC: 1.28 MG/DL — SIGNIFICANT CHANGE UP (ref 0.5–1.3)
CREAT SERPL-MCNC: 1.35 MG/DL — HIGH (ref 0.5–1.3)
GAS PNL BLDA: SIGNIFICANT CHANGE UP
GAS PNL BLDA: SIGNIFICANT CHANGE UP
GLUCOSE SERPL-MCNC: 114 MG/DL — HIGH (ref 70–99)
GLUCOSE SERPL-MCNC: 129 MG/DL — HIGH (ref 70–99)
GLUCOSE SERPL-MCNC: 132 MG/DL — HIGH (ref 70–99)
GLUCOSE SERPL-MCNC: 144 MG/DL — HIGH (ref 70–99)
HCO3 BLDA-SCNC: 27 MMOL/L — SIGNIFICANT CHANGE UP (ref 21–28)
HCT VFR BLD CALC: 36 % — LOW (ref 39–50)
HCT VFR BLD CALC: 36.5 % — LOW (ref 39–50)
HGB BLD-MCNC: 12.1 G/DL — LOW (ref 13–17)
HGB BLD-MCNC: 12.4 G/DL — LOW (ref 13–17)
INR BLD: 1.26 — HIGH (ref 0.88–1.16)
LACTATE SERPL-SCNC: 1.8 MMOL/L — SIGNIFICANT CHANGE UP (ref 0.5–2)
MAGNESIUM SERPL-MCNC: 2.1 MG/DL — SIGNIFICANT CHANGE UP (ref 1.6–2.6)
MAGNESIUM SERPL-MCNC: 2.2 MG/DL — SIGNIFICANT CHANGE UP (ref 1.6–2.6)
MAGNESIUM SERPL-MCNC: 2.4 MG/DL — SIGNIFICANT CHANGE UP (ref 1.6–2.6)
MAGNESIUM SERPL-MCNC: 2.4 MG/DL — SIGNIFICANT CHANGE UP (ref 1.6–2.6)
MCHC RBC-ENTMCNC: 31.4 PG — SIGNIFICANT CHANGE UP (ref 27–34)
MCHC RBC-ENTMCNC: 31.8 PG — SIGNIFICANT CHANGE UP (ref 27–34)
MCHC RBC-ENTMCNC: 33.6 G/DL — SIGNIFICANT CHANGE UP (ref 32–36)
MCHC RBC-ENTMCNC: 34 G/DL — SIGNIFICANT CHANGE UP (ref 32–36)
MCV RBC AUTO: 93.5 FL — SIGNIFICANT CHANGE UP (ref 80–100)
MCV RBC AUTO: 93.6 FL — SIGNIFICANT CHANGE UP (ref 80–100)
PCO2 BLDA: 39 MMHG — SIGNIFICANT CHANGE UP (ref 35–48)
PH BLDA: 7.46 — HIGH (ref 7.35–7.45)
PHOSPHATE SERPL-MCNC: 1.9 MG/DL — LOW (ref 2.5–4.5)
PHOSPHATE SERPL-MCNC: 3.1 MG/DL — SIGNIFICANT CHANGE UP (ref 2.5–4.5)
PLATELET # BLD AUTO: 147 K/UL — LOW (ref 150–400)
PLATELET # BLD AUTO: 165 K/UL — SIGNIFICANT CHANGE UP (ref 150–400)
PO2 BLDA: 105 MMHG — SIGNIFICANT CHANGE UP (ref 83–108)
POTASSIUM SERPL-MCNC: 3.7 MMOL/L — SIGNIFICANT CHANGE UP (ref 3.5–5.3)
POTASSIUM SERPL-MCNC: 4.1 MMOL/L — SIGNIFICANT CHANGE UP (ref 3.5–5.3)
POTASSIUM SERPL-MCNC: 4.4 MMOL/L — SIGNIFICANT CHANGE UP (ref 3.5–5.3)
POTASSIUM SERPL-MCNC: 4.6 MMOL/L — SIGNIFICANT CHANGE UP (ref 3.5–5.3)
POTASSIUM SERPL-SCNC: 3.7 MMOL/L — SIGNIFICANT CHANGE UP (ref 3.5–5.3)
POTASSIUM SERPL-SCNC: 4.1 MMOL/L — SIGNIFICANT CHANGE UP (ref 3.5–5.3)
POTASSIUM SERPL-SCNC: 4.4 MMOL/L — SIGNIFICANT CHANGE UP (ref 3.5–5.3)
POTASSIUM SERPL-SCNC: 4.6 MMOL/L — SIGNIFICANT CHANGE UP (ref 3.5–5.3)
PROTHROM AB SERPL-ACNC: 14.1 SEC — HIGH (ref 9.8–12.7)
RBC # BLD: 3.85 M/UL — LOW (ref 4.2–5.8)
RBC # BLD: 3.9 M/UL — LOW (ref 4.2–5.8)
RBC # FLD: 13.3 % — SIGNIFICANT CHANGE UP (ref 10.3–16.9)
RBC # FLD: 14 % — SIGNIFICANT CHANGE UP (ref 10.3–16.9)
SAO2 % BLDA: 98 % — SIGNIFICANT CHANGE UP (ref 95–100)
SODIUM SERPL-SCNC: 140 MMOL/L — SIGNIFICANT CHANGE UP (ref 135–145)
SODIUM SERPL-SCNC: 140 MMOL/L — SIGNIFICANT CHANGE UP (ref 135–145)
SODIUM SERPL-SCNC: 141 MMOL/L — SIGNIFICANT CHANGE UP (ref 135–145)
SODIUM SERPL-SCNC: 141 MMOL/L — SIGNIFICANT CHANGE UP (ref 135–145)
WBC # BLD: 8.9 K/UL — SIGNIFICANT CHANGE UP (ref 3.8–10.5)
WBC # BLD: 9.6 K/UL — SIGNIFICANT CHANGE UP (ref 3.8–10.5)
WBC # FLD AUTO: 8.9 K/UL — SIGNIFICANT CHANGE UP (ref 3.8–10.5)
WBC # FLD AUTO: 9.6 K/UL — SIGNIFICANT CHANGE UP (ref 3.8–10.5)

## 2017-10-06 PROCEDURE — 71010: CPT | Mod: 26

## 2017-10-06 PROCEDURE — 71010: CPT | Mod: 26,77

## 2017-10-06 PROCEDURE — 93010 ELECTROCARDIOGRAM REPORT: CPT | Mod: 76

## 2017-10-06 RX ORDER — SODIUM CHLORIDE 9 MG/ML
1000 INJECTION INTRAMUSCULAR; INTRAVENOUS; SUBCUTANEOUS
Qty: 0 | Refills: 0 | Status: COMPLETED | OUTPATIENT
Start: 2017-10-06 | End: 2017-10-07

## 2017-10-06 RX ORDER — AMIODARONE HYDROCHLORIDE 400 MG/1
150 TABLET ORAL ONCE
Qty: 0 | Refills: 0 | Status: COMPLETED | OUTPATIENT
Start: 2017-10-06 | End: 2017-10-06

## 2017-10-06 RX ORDER — SODIUM CHLORIDE 9 MG/ML
1000 INJECTION, SOLUTION INTRAVENOUS ONCE
Qty: 0 | Refills: 0 | Status: COMPLETED | OUTPATIENT
Start: 2017-10-06 | End: 2017-10-06

## 2017-10-06 RX ORDER — AMIODARONE HYDROCHLORIDE 400 MG/1
400 TABLET ORAL EVERY 8 HOURS
Qty: 0 | Refills: 0 | Status: COMPLETED | OUTPATIENT
Start: 2017-10-06 | End: 2017-10-10

## 2017-10-06 RX ORDER — POLYETHYLENE GLYCOL 3350 17 G/17G
17 POWDER, FOR SOLUTION ORAL DAILY
Qty: 0 | Refills: 0 | Status: DISCONTINUED | OUTPATIENT
Start: 2017-10-06 | End: 2017-10-10

## 2017-10-06 RX ORDER — AMIODARONE HYDROCHLORIDE 400 MG/1
200 TABLET ORAL DAILY
Qty: 0 | Refills: 0 | Status: DISCONTINUED | OUTPATIENT
Start: 2017-10-10 | End: 2017-10-10

## 2017-10-06 RX ORDER — HYDRALAZINE HCL 50 MG
10 TABLET ORAL ONCE
Qty: 0 | Refills: 0 | Status: COMPLETED | OUTPATIENT
Start: 2017-10-06 | End: 2017-10-06

## 2017-10-06 RX ORDER — SODIUM CHLORIDE 9 MG/ML
3 INJECTION INTRAMUSCULAR; INTRAVENOUS; SUBCUTANEOUS EVERY 8 HOURS
Qty: 0 | Refills: 0 | Status: DISCONTINUED | OUTPATIENT
Start: 2017-10-06 | End: 2017-10-10

## 2017-10-06 RX ORDER — SODIUM CHLORIDE 9 MG/ML
500 INJECTION INTRAMUSCULAR; INTRAVENOUS; SUBCUTANEOUS ONCE
Qty: 0 | Refills: 0 | Status: COMPLETED | OUTPATIENT
Start: 2017-10-06 | End: 2017-10-06

## 2017-10-06 RX ORDER — ACETAMINOPHEN 500 MG
650 TABLET ORAL EVERY 6 HOURS
Qty: 0 | Refills: 0 | Status: DISCONTINUED | OUTPATIENT
Start: 2017-10-06 | End: 2017-10-10

## 2017-10-06 RX ORDER — KETOROLAC TROMETHAMINE 30 MG/ML
15 SYRINGE (ML) INJECTION ONCE
Qty: 0 | Refills: 0 | Status: DISCONTINUED | OUTPATIENT
Start: 2017-10-06 | End: 2017-10-06

## 2017-10-06 RX ORDER — METOPROLOL TARTRATE 50 MG
5 TABLET ORAL ONCE
Qty: 0 | Refills: 0 | Status: COMPLETED | OUTPATIENT
Start: 2017-10-06 | End: 2017-10-06

## 2017-10-06 RX ADMIN — Medication 5 MILLIGRAM(S): at 15:43

## 2017-10-06 RX ADMIN — Medication 100 MILLIGRAM(S): at 23:38

## 2017-10-06 RX ADMIN — AMIODARONE HYDROCHLORIDE 133.33 MILLIGRAM(S): 400 TABLET ORAL at 18:22

## 2017-10-06 RX ADMIN — AMIODARONE HYDROCHLORIDE 400 MILLIGRAM(S): 400 TABLET ORAL at 19:51

## 2017-10-06 RX ADMIN — SODIUM CHLORIDE 1000 MILLILITER(S): 9 INJECTION, SOLUTION INTRAVENOUS at 03:38

## 2017-10-06 RX ADMIN — HEPARIN SODIUM 5000 UNIT(S): 5000 INJECTION INTRAVENOUS; SUBCUTANEOUS at 13:45

## 2017-10-06 RX ADMIN — AMIODARONE HYDROCHLORIDE 133.33 MILLIGRAM(S): 400 TABLET ORAL at 14:30

## 2017-10-06 RX ADMIN — Medication 25 MILLIGRAM(S): at 00:08

## 2017-10-06 RX ADMIN — SODIUM CHLORIDE 75 MILLILITER(S): 9 INJECTION INTRAMUSCULAR; INTRAVENOUS; SUBCUTANEOUS at 20:25

## 2017-10-06 RX ADMIN — Medication 15 MILLIGRAM(S): at 04:30

## 2017-10-06 RX ADMIN — Medication 100 MILLIGRAM(S): at 18:17

## 2017-10-06 RX ADMIN — LIDOCAINE 1 PATCH: 4 CREAM TOPICAL at 23:30

## 2017-10-06 RX ADMIN — LIDOCAINE 1 PATCH: 4 CREAM TOPICAL at 10:30

## 2017-10-06 RX ADMIN — HEPARIN SODIUM 5000 UNIT(S): 5000 INJECTION INTRAVENOUS; SUBCUTANEOUS at 07:57

## 2017-10-06 RX ADMIN — FAMOTIDINE 20 MILLIGRAM(S): 10 INJECTION INTRAVENOUS at 18:17

## 2017-10-06 RX ADMIN — Medication 10 MILLIGRAM(S): at 03:36

## 2017-10-06 RX ADMIN — OXYCODONE AND ACETAMINOPHEN 1 TABLET(S): 5; 325 TABLET ORAL at 03:10

## 2017-10-06 RX ADMIN — Medication 100 MILLIGRAM(S): at 07:57

## 2017-10-06 RX ADMIN — Medication 100 MILLIGRAM(S): at 15:54

## 2017-10-06 RX ADMIN — Medication 81 MILLIGRAM(S): at 10:30

## 2017-10-06 RX ADMIN — SODIUM CHLORIDE 1000 MILLILITER(S): 9 INJECTION, SOLUTION INTRAVENOUS at 00:08

## 2017-10-06 RX ADMIN — FAMOTIDINE 20 MILLIGRAM(S): 10 INJECTION INTRAVENOUS at 07:57

## 2017-10-06 RX ADMIN — SODIUM CHLORIDE 3 MILLILITER(S): 9 INJECTION INTRAMUSCULAR; INTRAVENOUS; SUBCUTANEOUS at 13:46

## 2017-10-06 RX ADMIN — Medication 25 MILLIGRAM(S): at 07:57

## 2017-10-06 RX ADMIN — HEPARIN SODIUM 5000 UNIT(S): 5000 INJECTION INTRAVENOUS; SUBCUTANEOUS at 21:45

## 2017-10-06 RX ADMIN — SENNA PLUS 2 TABLET(S): 8.6 TABLET ORAL at 21:45

## 2017-10-06 RX ADMIN — CLOPIDOGREL BISULFATE 75 MILLIGRAM(S): 75 TABLET, FILM COATED ORAL at 10:30

## 2017-10-06 RX ADMIN — ATORVASTATIN CALCIUM 10 MILLIGRAM(S): 80 TABLET, FILM COATED ORAL at 21:45

## 2017-10-06 RX ADMIN — Medication 15 MILLIGRAM(S): at 04:10

## 2017-10-06 RX ADMIN — SODIUM CHLORIDE 3 MILLILITER(S): 9 INJECTION INTRAMUSCULAR; INTRAVENOUS; SUBCUTANEOUS at 21:34

## 2017-10-06 RX ADMIN — FINASTERIDE 5 MILLIGRAM(S): 5 TABLET, FILM COATED ORAL at 10:31

## 2017-10-06 RX ADMIN — SODIUM CHLORIDE 1000 MILLILITER(S): 9 INJECTION INTRAMUSCULAR; INTRAVENOUS; SUBCUTANEOUS at 18:45

## 2017-10-06 RX ADMIN — Medication 25 MILLIGRAM(S): at 13:45

## 2017-10-06 NOTE — CHART NOTE - NSCHARTNOTEFT_GEN_A_CORE
CT Removal:    Pt seen and examined at bedside.  Case discussed with Dr. Parham.  Minimal output from CT.  No air leak appreciated.  CT removed without incident per Dr. Parham request.  Tie down secured and occlusive DSD placed.  CXR no obvious PTX noted.  Pt tolerated procedure well.

## 2017-10-06 NOTE — PROGRESS NOTE ADULT - SUBJECTIVE AND OBJECTIVE BOX
Patient discussed on morning rounds with Dr. Diaz     Operation / Date: MIDCAB 10/5/17     SUBJECTIVE ASSESSMENT:  71y Male reports feeling palpitations/funny feeling in his chest. States that pain has improved. Also reports having large urine output. Denies chest pain, SOB, dizziness, lightheadednes, n/v/d abdominal pain, vision changes.         Vital Signs Last 24 Hrs  T(C): 37.5 (06 Oct 2017 18:31), Max: 37.8 (06 Oct 2017 14:00)  T(F): 99.5 (06 Oct 2017 18:31), Max: 100 (06 Oct 2017 14:00)  HR: 119 (06 Oct 2017 18:15) (58 - 125)  BP: 100/68 (06 Oct 2017 18:15) (100/68 - 141/64)  BP(mean): 80 (06 Oct 2017 18:15) (77 - 101)  RR: 16 (06 Oct 2017 18:15) (14 - 25)  SpO2: 94% (06 Oct 2017 18:15) (93% - 100%)  I&O's Detail    05 Oct 2017 07:01  -  06 Oct 2017 07:00  --------------------------------------------------------  IN:    clevidipine Infusion: 118 mL    IV PiggyBack: 2900 mL    sodium chloride 0.45%: 190 mL  Total IN: 3208 mL    OUT:    Chest Tube: 500 mL    Indwelling Catheter - Urethral: 3895 mL  Total OUT: 4395 mL    Total NET: -1187 mL      06 Oct 2017 07:01  -  06 Oct 2017 19:00  --------------------------------------------------------  IN:    IV PiggyBack: 250 mL    Oral Fluid: 180 mL  Total IN: 430 mL    OUT:    Chest Tube: 60 mL    Voided: 1185 mL  Total OUT: 1245 mL    Total NET: -815 mL        PHYSICAL EXAM:    General: AOx3 in no acute distress. Lying in bed     Neurological: No focal neuro deficit. No gait or speech abnormality. Mobilty and sensation intact at all imbs     Cardiovascular: irregularly irregular. No murmurs rubs or gallops    Respiratory: CTA bilaterally No wheeze rhonchi or rales     Gastrointestinal: soft non tender non distended. active bowel sounds    Extremities: WWP no lower extremity edema.     Vascular: 2+ DP pulses bilaterally 2+ radial pulses bilaterally     Incision Sites: Left thoracotomy incision c/d/i no ecchymosis mild TTP     LABS:                        12.4   8.9   )-----------( 165      ( 06 Oct 2017 14:40 )             36.5     PT/INR - ( 06 Oct 2017 02:33 )   PT: 14.1 sec;   INR: 1.26          PTT - ( 06 Oct 2017 02:33 )  PTT:26.4 sec    10-06    141  |  101  |  13  ----------------------------<  114<H>  4.6   |  29  |  1.35<H>    Ca    9.8      06 Oct 2017 14:19  Phos  1.9     10-06  Mg     2.4     10-06    TPro  7.1  /  Alb  3.8  /  TBili  0.6  /  DBili  <0.2  /  AST  34  /  ALT  23  /  AlkPhos  45  10-05          MEDICATIONS  (STANDING):  aspirin  chewable 81 milliGRAM(s) Oral daily  atorvastatin 10 milliGRAM(s) Oral at bedtime  ceFAZolin   IVPB 2000 milliGRAM(s) IV Intermittent every 8 hours  clopidogrel Tablet 75 milliGRAM(s) Oral daily  dextrose 5%. 1000 milliLiter(s) (50 mL/Hr) IV Continuous <Continuous>  dextrose 50% Injectable 12.5 Gram(s) IV Push once  dextrose 50% Injectable 25 Gram(s) IV Push once  dextrose 50% Injectable 25 Gram(s) IV Push once  docusate sodium 100 milliGRAM(s) Oral two times a day  famotidine    Tablet 20 milliGRAM(s) Oral two times a day  finasteride 5 milliGRAM(s) Oral daily  heparin  Injectable 5000 Unit(s) SubCutaneous every 8 hours  lidocaine   Patch 1 Patch Transdermal daily  metoprolol 25 milliGRAM(s) Oral every 6 hours  senna 2 Tablet(s) Oral at bedtime  sodium chloride 0.9% lock flush 3 milliLiter(s) IV Push every 8 hours    MEDICATIONS  (PRN):  acetaminophen   Tablet 650 milliGRAM(s) Oral every 6 hours PRN mild pain  dextrose Gel 1 Dose(s) Oral once PRN Blood Glucose LESS THAN 70 milliGRAM(s)/deciliter  glucagon  Injectable 1 milliGRAM(s) IntraMuscular once PRN Glucose LESS THAN 70 milligrams/deciliter  oxyCODONE    5 mG/acetaminophen 325 mG 1 Tablet(s) Oral every 4 hours PRN Moderate Pain (4 - 6)  polyethylene glycol 3350 17 Gram(s) Oral daily PRN Constipation        RADIOLOGY & ADDITIONAL TESTS:  < from: Xray Chest 1 View AP -PORTABLE-Routine (10.06.17 @ 03:02) >    TECHNIQUE: Chest, single portable AP view on  10/6/2017 3:02 AM     COMPARISON: Chest radiograph 10/5/2017    FINDINGS/  IMPRESSION:   The right IJ central venous catheter and left lower hemithorax chest tube   drainage catheters are unchanged in positions.    There is persistent trace left pleural effusion. No pneumothorax is   evident. Small patchy right medial basilar opacity. Cardiomediastinal   silhouette is grossly unchanged in appearance.     < end of copied text >

## 2017-10-06 NOTE — PROGRESS NOTE ADULT - ASSESSMENT
72 yo male with a history of HTN, HLD, prostate CA, CKD stage 2-3 and CAD s/p PCI to RCA (2006/2011) presented to his cardiologist with c/o severe chest discomfort and sob/dyspnea with activity. Patient was found to have 2vCAD. He is POD #1 from a MIDCAB [place] to LAD. Patient was transferred to the floor today after chest tube was removed. Shortly after transfer patient went into Afib with RVR. He was given amio bolus x2 and  lopressor. Patient is aurtodiuresing a large amount. Giving IV fluids back.     Neuro: pain control   - Tylenol and Percocet    CVS: HTN. HLD and CAD s/p MIDCAB, planned PCI on Monday. Patient currently in afib rate controlled   - CAD s/p previous stents continue ASA, Plavix and Atorvastatin   - continue with Lopressor 25mg every 8 hours and started on amiodarone load for afib.   - Giving back fluids for autodiuresis. Replete electrolytes as need to prevent further afib  - PCI on monday     Respiratory: No active issues. Chest tube removed  - IS and ambulation     GI: no active issues. Follow up post op BM   - continue pepcid for GI Ppx  - continue bowel regimen     : history of prostate cancer, CKD, Creatinine bump this afternoon to 1.34. likely due to dehydration secondary to autodiuresis.   - continue finasteride.  - Giving IV fluids now.   - follow up repeat BMP  - replete electrolytes as needed    Heme: H/H stable   -continue to monitor CBC   -continue Heparin subq for DVT ppx     ID: no active issues WBC 8.9 afebrile   -continue perioperative ABX     Endo: no history of DM   - ISS    Haylie Allen PA-C

## 2017-10-07 LAB
ANION GAP SERPL CALC-SCNC: 11 MMOL/L — SIGNIFICANT CHANGE UP (ref 5–17)
APTT BLD: 29.3 SEC — SIGNIFICANT CHANGE UP (ref 27.5–37.4)
BASOPHILS NFR BLD AUTO: 0.1 % — SIGNIFICANT CHANGE UP (ref 0–2)
BUN SERPL-MCNC: 13 MG/DL — SIGNIFICANT CHANGE UP (ref 7–23)
CALCIUM SERPL-MCNC: 8.9 MG/DL — SIGNIFICANT CHANGE UP (ref 8.4–10.5)
CHLORIDE SERPL-SCNC: 102 MMOL/L — SIGNIFICANT CHANGE UP (ref 96–108)
CO2 SERPL-SCNC: 27 MMOL/L — SIGNIFICANT CHANGE UP (ref 22–31)
CREAT SERPL-MCNC: 1.1 MG/DL — SIGNIFICANT CHANGE UP (ref 0.5–1.3)
EOSINOPHIL NFR BLD AUTO: 0.3 % — SIGNIFICANT CHANGE UP (ref 0–6)
GLUCOSE SERPL-MCNC: 96 MG/DL — SIGNIFICANT CHANGE UP (ref 70–99)
HCT VFR BLD CALC: 35.5 % — LOW (ref 39–50)
HGB BLD-MCNC: 11.4 G/DL — LOW (ref 13–17)
INR BLD: 1.24 — HIGH (ref 0.88–1.16)
LYMPHOCYTES # BLD AUTO: 16.1 % — SIGNIFICANT CHANGE UP (ref 13–44)
MAGNESIUM SERPL-MCNC: 2 MG/DL — SIGNIFICANT CHANGE UP (ref 1.6–2.6)
MCHC RBC-ENTMCNC: 31 PG — SIGNIFICANT CHANGE UP (ref 27–34)
MCHC RBC-ENTMCNC: 32.1 G/DL — SIGNIFICANT CHANGE UP (ref 32–36)
MCV RBC AUTO: 96.5 FL — SIGNIFICANT CHANGE UP (ref 80–100)
MONOCYTES NFR BLD AUTO: 10.2 % — SIGNIFICANT CHANGE UP (ref 2–14)
NEUTROPHILS NFR BLD AUTO: 73.3 % — SIGNIFICANT CHANGE UP (ref 43–77)
PLATELET # BLD AUTO: 149 K/UL — LOW (ref 150–400)
POTASSIUM SERPL-MCNC: 3.8 MMOL/L — SIGNIFICANT CHANGE UP (ref 3.5–5.3)
POTASSIUM SERPL-SCNC: 3.8 MMOL/L — SIGNIFICANT CHANGE UP (ref 3.5–5.3)
PROTHROM AB SERPL-ACNC: 13.8 SEC — HIGH (ref 9.8–12.7)
RBC # BLD: 3.68 M/UL — LOW (ref 4.2–5.8)
RBC # FLD: 14.1 % — SIGNIFICANT CHANGE UP (ref 10.3–16.9)
SODIUM SERPL-SCNC: 140 MMOL/L — SIGNIFICANT CHANGE UP (ref 135–145)
WBC # BLD: 8.6 K/UL — SIGNIFICANT CHANGE UP (ref 3.8–10.5)
WBC # FLD AUTO: 8.6 K/UL — SIGNIFICANT CHANGE UP (ref 3.8–10.5)

## 2017-10-07 PROCEDURE — 71020: CPT | Mod: 26

## 2017-10-07 RX ORDER — MAGNESIUM OXIDE 400 MG ORAL TABLET 241.3 MG
400 TABLET ORAL ONCE
Qty: 0 | Refills: 0 | Status: COMPLETED | OUTPATIENT
Start: 2017-10-07 | End: 2017-10-07

## 2017-10-07 RX ORDER — POTASSIUM CHLORIDE 20 MEQ
20 PACKET (EA) ORAL ONCE
Qty: 0 | Refills: 0 | Status: COMPLETED | OUTPATIENT
Start: 2017-10-07 | End: 2017-10-07

## 2017-10-07 RX ORDER — MAGNESIUM HYDROXIDE 400 MG/1
30 TABLET, CHEWABLE ORAL DAILY
Qty: 0 | Refills: 0 | Status: DISCONTINUED | OUTPATIENT
Start: 2017-10-07 | End: 2017-10-10

## 2017-10-07 RX ORDER — ALBUMIN HUMAN 25 %
250 VIAL (ML) INTRAVENOUS
Qty: 0 | Refills: 0 | Status: COMPLETED | OUTPATIENT
Start: 2017-10-07 | End: 2017-10-07

## 2017-10-07 RX ORDER — SODIUM CHLORIDE 9 MG/ML
500 INJECTION INTRAMUSCULAR; INTRAVENOUS; SUBCUTANEOUS ONCE
Qty: 0 | Refills: 0 | Status: COMPLETED | OUTPATIENT
Start: 2017-10-07 | End: 2017-10-07

## 2017-10-07 RX ADMIN — AMIODARONE HYDROCHLORIDE 400 MILLIGRAM(S): 400 TABLET ORAL at 02:58

## 2017-10-07 RX ADMIN — AMIODARONE HYDROCHLORIDE 400 MILLIGRAM(S): 400 TABLET ORAL at 13:06

## 2017-10-07 RX ADMIN — SENNA PLUS 2 TABLET(S): 8.6 TABLET ORAL at 21:36

## 2017-10-07 RX ADMIN — CLOPIDOGREL BISULFATE 75 MILLIGRAM(S): 75 TABLET, FILM COATED ORAL at 13:07

## 2017-10-07 RX ADMIN — FAMOTIDINE 20 MILLIGRAM(S): 10 INJECTION INTRAVENOUS at 05:23

## 2017-10-07 RX ADMIN — MAGNESIUM OXIDE 400 MG ORAL TABLET 400 MILLIGRAM(S): 241.3 TABLET ORAL at 17:13

## 2017-10-07 RX ADMIN — ATORVASTATIN CALCIUM 10 MILLIGRAM(S): 80 TABLET, FILM COATED ORAL at 21:37

## 2017-10-07 RX ADMIN — SODIUM CHLORIDE 75 MILLILITER(S): 9 INJECTION INTRAMUSCULAR; INTRAVENOUS; SUBCUTANEOUS at 15:19

## 2017-10-07 RX ADMIN — SODIUM CHLORIDE 3 MILLILITER(S): 9 INJECTION INTRAMUSCULAR; INTRAVENOUS; SUBCUTANEOUS at 14:24

## 2017-10-07 RX ADMIN — SODIUM CHLORIDE 500 MILLILITER(S): 9 INJECTION INTRAMUSCULAR; INTRAVENOUS; SUBCUTANEOUS at 18:41

## 2017-10-07 RX ADMIN — LIDOCAINE 1 PATCH: 4 CREAM TOPICAL at 13:22

## 2017-10-07 RX ADMIN — Medication 81 MILLIGRAM(S): at 13:07

## 2017-10-07 RX ADMIN — Medication 25 MILLIGRAM(S): at 13:23

## 2017-10-07 RX ADMIN — Medication 25 MILLIGRAM(S): at 05:23

## 2017-10-07 RX ADMIN — Medication 125 MILLILITER(S): at 01:15

## 2017-10-07 RX ADMIN — HEPARIN SODIUM 5000 UNIT(S): 5000 INJECTION INTRAVENOUS; SUBCUTANEOUS at 14:45

## 2017-10-07 RX ADMIN — Medication 100 MILLIGRAM(S): at 17:13

## 2017-10-07 RX ADMIN — Medication 20 MILLIEQUIVALENT(S): at 17:13

## 2017-10-07 RX ADMIN — FAMOTIDINE 20 MILLIGRAM(S): 10 INJECTION INTRAVENOUS at 17:13

## 2017-10-07 RX ADMIN — Medication 100 MILLIGRAM(S): at 05:23

## 2017-10-07 RX ADMIN — HEPARIN SODIUM 5000 UNIT(S): 5000 INJECTION INTRAVENOUS; SUBCUTANEOUS at 21:36

## 2017-10-07 RX ADMIN — FINASTERIDE 5 MILLIGRAM(S): 5 TABLET, FILM COATED ORAL at 13:06

## 2017-10-07 RX ADMIN — Medication 125 MILLILITER(S): at 02:10

## 2017-10-07 RX ADMIN — SODIUM CHLORIDE 3 MILLILITER(S): 9 INJECTION INTRAMUSCULAR; INTRAVENOUS; SUBCUTANEOUS at 21:32

## 2017-10-07 RX ADMIN — Medication 25 MILLIGRAM(S): at 18:54

## 2017-10-07 RX ADMIN — Medication 25 MILLIGRAM(S): at 00:44

## 2017-10-07 RX ADMIN — AMIODARONE HYDROCHLORIDE 400 MILLIGRAM(S): 400 TABLET ORAL at 21:36

## 2017-10-07 RX ADMIN — Medication 25 MILLIGRAM(S): at 23:39

## 2017-10-07 RX ADMIN — POLYETHYLENE GLYCOL 3350 17 GRAM(S): 17 POWDER, FOR SOLUTION ORAL at 21:36

## 2017-10-07 RX ADMIN — SODIUM CHLORIDE 3 MILLILITER(S): 9 INJECTION INTRAMUSCULAR; INTRAVENOUS; SUBCUTANEOUS at 05:21

## 2017-10-07 NOTE — PROGRESS NOTE ADULT - SUBJECTIVE AND OBJECTIVE BOX
Patient discussed on morning rounds with Dr. Bland    Operation / Date: MIDCAB 10/5/17    SUBJECTIVE ASSESSMENT:  71y Male seen and examined at the bedside. Overnight with atrial fibrillation converted to NSR with amio IV bolus.  Today pt feels well denies SOB or CP..    Vital Signs Last 24 Hrs  T(C): 36.7 (07 Oct 2017 13:55), Max: 37.6 (07 Oct 2017 04:00)  T(F): 98.1 (07 Oct 2017 13:55), Max: 99.7 (07 Oct 2017 04:00)  HR: 72 (07 Oct 2017 15:00) (70 - 119)  BP: 120/66 (07 Oct 2017 15:00) (87/60 - 124/67)  BP(mean): 88 (07 Oct 2017 15:00) (70 - 88)  RR: 18 (07 Oct 2017 15:00) (15 - 19)  SpO2: 93% (07 Oct 2017 15:00) (93% - 95%)  I&O's Detail    06 Oct 2017 07:01  -  07 Oct 2017 07:00  --------------------------------------------------------  IN:    Albumin 5%  - 250 mL: 500 mL    IV PiggyBack: 800 mL    Oral Fluid: 180 mL    sodium chloride 0.9%: 900 mL  Total IN: 2380 mL    OUT:    Chest Tube: 60 mL    Voided: 2585 mL  Total OUT: 2645 mL    Total NET: -265 mL      07 Oct 2017 07:01  -  07 Oct 2017 15:54  --------------------------------------------------------  IN:    sodium chloride 0.9%: 600 mL  Total IN: 600 mL    OUT:    Voided: 150 mL  Total OUT: 150 mL    Total NET: 450 mL    CHEST TUBE:  No. .   CHARLES DRAIN:  No.  EPICARDIAL WIRES: No.  TIE DOWNS: Yes  HOWARD: No.    PHYSICAL EXAM:    General:     Neurological:    Cardiovascular:    Respiratory:    Gastrointestinal:    Extremities:    Vascular:    Incision Sites:    LABS:                        11.4   8.6   )-----------( 149      ( 07 Oct 2017 06:22 )             35.5       COUMADIN:  No    PT/INR - ( 07 Oct 2017 06:22 )   PT: 13.8 sec;   INR: 1.24     PTT - ( 07 Oct 2017 06:22 )  PTT:29.3 sec    10-07    140  |  102  |  13  ----------------------------<  96  3.8   |  27  |  1.10    Ca    8.9      07 Oct 2017 06:22  Phos  1.9     10-06  Mg     2.0     10-07    TPro  7.1  /  Alb  3.8  /  TBili  0.6  /  DBili  <0.2  /  AST  34  /  ALT  23  /  AlkPhos  45  10-05      MEDICATIONS  (STANDING):  amiodarone    Tablet 400 milliGRAM(s) Oral every 8 hours  aspirin  chewable 81 milliGRAM(s) Oral daily  atorvastatin 10 milliGRAM(s) Oral at bedtime  clopidogrel Tablet 75 milliGRAM(s) Oral daily  dextrose 5%. 1000 milliLiter(s) (50 mL/Hr) IV Continuous <Continuous>  dextrose 50% Injectable 12.5 Gram(s) IV Push once  dextrose 50% Injectable 25 Gram(s) IV Push once  dextrose 50% Injectable 25 Gram(s) IV Push once  docusate sodium 100 milliGRAM(s) Oral two times a day  famotidine    Tablet 20 milliGRAM(s) Oral two times a day  finasteride 5 milliGRAM(s) Oral daily  heparin  Injectable 5000 Unit(s) SubCutaneous every 8 hours  lidocaine   Patch 1 Patch Transdermal daily  magnesium oxide 400 milliGRAM(s) Oral once  metoprolol 25 milliGRAM(s) Oral every 6 hours  potassium chloride    Tablet ER 20 milliEquivalent(s) Oral once  senna 2 Tablet(s) Oral at bedtime  sodium chloride 0.9% lock flush 3 milliLiter(s) IV Push every 8 hours    MEDICATIONS  (PRN):  acetaminophen   Tablet 650 milliGRAM(s) Oral every 6 hours PRN mild pain  dextrose Gel 1 Dose(s) Oral once PRN Blood Glucose LESS THAN 70 milliGRAM(s)/deciliter  glucagon  Injectable 1 milliGRAM(s) IntraMuscular once PRN Glucose LESS THAN 70 milligrams/deciliter  oxyCODONE    5 mG/acetaminophen 325 mG 1 Tablet(s) Oral every 4 hours PRN Moderate Pain (4 - 6)  polyethylene glycol 3350 17 Gram(s) Oral daily PRN Constipation      RADIOLOGY & ADDITIONAL TESTS:  CXR: < from: Xray Chest 1 View AP-PORTABLE IMMEDIATE (10.06.17 @ 10:18) >  Portable examination the chest shows the heart to be within normal limits   in transverse diameter. No acute. Possible small effusion. Discoid   changes lung bases.    Impression: No acute infiltrates      A/P: 72 yo male with a history of HTN, HLD, prostate CA, CKD stage 2-3 and CAD s/p PCI to RCA (2006/2011) presented to his cardiologist with c/o severe chest discomfort and sob/dyspnea with activity. 10/5/17 pt underwent MIDCAB, post-op complicated by AF which converted to NSR with amiodarone PO/IV load. Plan for PCI monday.     Neurovascular: No delirium. Pain well controlled with current regimen.  -PRN tylenol and percocet     Cardiovascular: Hemodynamically stable. HR controlled. post-op AF now in NSR  -plan for PCI monday   -continue to monitor BB/HR/Tele   -continue Lopressor 25mg PO q6h, lipitor 10mg PO QD, ASA/Plavix    Respiratory: 02 Sat = 93% on RA.  -Encourage C+DB and Use of IS 10x / hr while awake.  -PA/Lat CXR today     GI: Stable.  -PPX- pepcid  -PO Diet.    Renal / : continue to trend daily labs, autodiuresis post-op requiring hydration, improved   -Monitor renal function.  -Monitor I/O's.    Endocrine:  no h/o DM or thyroid disease     Hematologic: continue to trend daily labs. H/H 1.4/35.5  -CBC.  -Coagulation Panel.    ID:no acute issues   -Temp- afebrile  -CBC. WBC 8.6  -Observe for SIRS/Sepsis Syndrome.    Prophylaxis:  -DVT prophylaxis with 5000 SubQ Heparin q8h.  -SCD's    Disposition:  -PCI Monday, Home Tuesday Patient discussed on morning rounds with Dr. Bland    Operation / Date: MIDCAB 10/5/17    SUBJECTIVE ASSESSMENT:  71y Male seen and examined at the bedside. Overnight with atrial fibrillation converted to NSR with amio IV bolus.  Today pt feels well denies SOB or CP..    Vital Signs Last 24 Hrs  T(C): 36.7 (07 Oct 2017 13:55), Max: 37.6 (07 Oct 2017 04:00)  T(F): 98.1 (07 Oct 2017 13:55), Max: 99.7 (07 Oct 2017 04:00)  HR: 72 (07 Oct 2017 15:00) (70 - 119)  BP: 120/66 (07 Oct 2017 15:00) (87/60 - 124/67)  BP(mean): 88 (07 Oct 2017 15:00) (70 - 88)  RR: 18 (07 Oct 2017 15:00) (15 - 19)  SpO2: 93% (07 Oct 2017 15:00) (93% - 95%)  I&O's Detail    06 Oct 2017 07:01  -  07 Oct 2017 07:00  --------------------------------------------------------  IN:    Albumin 5%  - 250 mL: 500 mL    IV PiggyBack: 800 mL    Oral Fluid: 180 mL    sodium chloride 0.9%: 900 mL  Total IN: 2380 mL    OUT:    Chest Tube: 60 mL    Voided: 2585 mL  Total OUT: 2645 mL    Total NET: -265 mL      07 Oct 2017 07:01  -  07 Oct 2017 15:54  --------------------------------------------------------  IN:    sodium chloride 0.9%: 600 mL  Total IN: 600 mL    OUT:    Voided: 150 mL  Total OUT: 150 mL    Total NET: 450 mL    CHEST TUBE:  No. .   CHARLES DRAIN:  No.  EPICARDIAL WIRES: No.  TIE DOWNS: Yes  HOWARD: No.    PHYSICAL EXAM:    General: NAd, sitting upright in bed     Neurological: moving all extremities with no focal deficits    Cardiovascular: RRR, no m/r/g    Respiratory: slight crackles at LLL, all other lung fields CTA     Gastrointestinal: NT-ND, soft     Extremities: warm and well perfused no edema or calf tenderness b/l     Incision Sites: left thoractomy mild swelling, no drainage or erythema     LABS:                        11.4   8.6   )-----------( 149      ( 07 Oct 2017 06:22 )             35.5       COUMADIN:  No    PT/INR - ( 07 Oct 2017 06:22 )   PT: 13.8 sec;   INR: 1.24     PTT - ( 07 Oct 2017 06:22 )  PTT:29.3 sec    10-07    140  |  102  |  13  ----------------------------<  96  3.8   |  27  |  1.10    Ca    8.9      07 Oct 2017 06:22  Phos  1.9     10-06  Mg     2.0     10-07    TPro  7.1  /  Alb  3.8  /  TBili  0.6  /  DBili  <0.2  /  AST  34  /  ALT  23  /  AlkPhos  45  10-05      MEDICATIONS  (STANDING):  amiodarone    Tablet 400 milliGRAM(s) Oral every 8 hours  aspirin  chewable 81 milliGRAM(s) Oral daily  atorvastatin 10 milliGRAM(s) Oral at bedtime  clopidogrel Tablet 75 milliGRAM(s) Oral daily  dextrose 5%. 1000 milliLiter(s) (50 mL/Hr) IV Continuous <Continuous>  dextrose 50% Injectable 12.5 Gram(s) IV Push once  dextrose 50% Injectable 25 Gram(s) IV Push once  dextrose 50% Injectable 25 Gram(s) IV Push once  docusate sodium 100 milliGRAM(s) Oral two times a day  famotidine    Tablet 20 milliGRAM(s) Oral two times a day  finasteride 5 milliGRAM(s) Oral daily  heparin  Injectable 5000 Unit(s) SubCutaneous every 8 hours  lidocaine   Patch 1 Patch Transdermal daily  magnesium oxide 400 milliGRAM(s) Oral once  metoprolol 25 milliGRAM(s) Oral every 6 hours  potassium chloride    Tablet ER 20 milliEquivalent(s) Oral once  senna 2 Tablet(s) Oral at bedtime  sodium chloride 0.9% lock flush 3 milliLiter(s) IV Push every 8 hours    MEDICATIONS  (PRN):  acetaminophen   Tablet 650 milliGRAM(s) Oral every 6 hours PRN mild pain  dextrose Gel 1 Dose(s) Oral once PRN Blood Glucose LESS THAN 70 milliGRAM(s)/deciliter  glucagon  Injectable 1 milliGRAM(s) IntraMuscular once PRN Glucose LESS THAN 70 milligrams/deciliter  oxyCODONE    5 mG/acetaminophen 325 mG 1 Tablet(s) Oral every 4 hours PRN Moderate Pain (4 - 6)  polyethylene glycol 3350 17 Gram(s) Oral daily PRN Constipation      RADIOLOGY & ADDITIONAL TESTS:  CXR: < from: Xray Chest 1 View AP-PORTABLE IMMEDIATE (10.06.17 @ 10:18) >  Portable examination the chest shows the heart to be within normal limits   in transverse diameter. No acute. Possible small effusion. Discoid   changes lung bases.    Impression: No acute infiltrates      A/P: 72 yo male with a history of HTN, HLD, prostate CA, CKD stage 2-3 and CAD s/p PCI to RCA (2006/2011) presented to his cardiologist with c/o severe chest discomfort and sob/dyspnea with activity. 10/5/17 pt underwent MIDCAB, post-op complicated by AF which converted to NSR with amiodarone PO/IV load. Plan for PCI monday.     Neurovascular: No delirium. Pain well controlled with current regimen.  -PRN tylenol and percocet     Cardiovascular: Hemodynamically stable. HR controlled. post-op AF now in NSR  -plan for PCI monday   -continue to monitor BB/HR/Tele   -continue Lopressor 25mg PO q6h, lipitor 10mg PO QD, ASA/Plavix    Respiratory: 02 Sat = 93% on RA.  -Encourage C+DB and Use of IS 10x / hr while awake.  -PA/Lat CXR today     GI: Stable.  -PPX- pepcid  -PO Diet.    Renal / : continue to trend daily labs, autodiuresis post-op requiring hydration, improved   -Monitor renal function.  -Monitor I/O's.    Endocrine:  no h/o DM or thyroid disease     Hematologic: continue to trend daily labs. H/H 1.4/35.5  -CBC.  -Coagulation Panel.    ID:no acute issues   -Temp- afebrile  -CBC. WBC 8.6  -Observe for SIRS/Sepsis Syndrome.    Prophylaxis:  -DVT prophylaxis with 5000 SubQ Heparin q8h.  -SCD's    Disposition:  -PCI Monday, Home Tuesday

## 2017-10-08 LAB
ANION GAP SERPL CALC-SCNC: 12 MMOL/L — SIGNIFICANT CHANGE UP (ref 5–17)
BLD GP AB SCN SERPL QL: NEGATIVE — SIGNIFICANT CHANGE UP
BUN SERPL-MCNC: 15 MG/DL — SIGNIFICANT CHANGE UP (ref 7–23)
CALCIUM SERPL-MCNC: 9 MG/DL — SIGNIFICANT CHANGE UP (ref 8.4–10.5)
CHLORIDE SERPL-SCNC: 104 MMOL/L — SIGNIFICANT CHANGE UP (ref 96–108)
CO2 SERPL-SCNC: 25 MMOL/L — SIGNIFICANT CHANGE UP (ref 22–31)
CREAT SERPL-MCNC: 1.18 MG/DL — SIGNIFICANT CHANGE UP (ref 0.5–1.3)
GLUCOSE SERPL-MCNC: 91 MG/DL — SIGNIFICANT CHANGE UP (ref 70–99)
HCT VFR BLD CALC: 34.4 % — LOW (ref 39–50)
HGB BLD-MCNC: 11.8 G/DL — LOW (ref 13–17)
MAGNESIUM SERPL-MCNC: 2.2 MG/DL — SIGNIFICANT CHANGE UP (ref 1.6–2.6)
MCHC RBC-ENTMCNC: 31.8 PG — SIGNIFICANT CHANGE UP (ref 27–34)
MCHC RBC-ENTMCNC: 34.3 G/DL — SIGNIFICANT CHANGE UP (ref 32–36)
MCV RBC AUTO: 92.7 FL — SIGNIFICANT CHANGE UP (ref 80–100)
PLATELET # BLD AUTO: 146 K/UL — LOW (ref 150–400)
POTASSIUM SERPL-MCNC: 4.3 MMOL/L — SIGNIFICANT CHANGE UP (ref 3.5–5.3)
POTASSIUM SERPL-SCNC: 4.3 MMOL/L — SIGNIFICANT CHANGE UP (ref 3.5–5.3)
RBC # BLD: 3.71 M/UL — LOW (ref 4.2–5.8)
RBC # FLD: 13.8 % — SIGNIFICANT CHANGE UP (ref 10.3–16.9)
RH IG SCN BLD-IMP: POSITIVE — SIGNIFICANT CHANGE UP
SODIUM SERPL-SCNC: 141 MMOL/L — SIGNIFICANT CHANGE UP (ref 135–145)
WBC # BLD: 7.9 K/UL — SIGNIFICANT CHANGE UP (ref 3.8–10.5)
WBC # FLD AUTO: 7.9 K/UL — SIGNIFICANT CHANGE UP (ref 3.8–10.5)

## 2017-10-08 RX ADMIN — HEPARIN SODIUM 5000 UNIT(S): 5000 INJECTION INTRAVENOUS; SUBCUTANEOUS at 05:21

## 2017-10-08 RX ADMIN — Medication 25 MILLIGRAM(S): at 18:36

## 2017-10-08 RX ADMIN — AMIODARONE HYDROCHLORIDE 400 MILLIGRAM(S): 400 TABLET ORAL at 11:19

## 2017-10-08 RX ADMIN — AMIODARONE HYDROCHLORIDE 400 MILLIGRAM(S): 400 TABLET ORAL at 05:21

## 2017-10-08 RX ADMIN — LIDOCAINE 1 PATCH: 4 CREAM TOPICAL at 01:00

## 2017-10-08 RX ADMIN — HEPARIN SODIUM 5000 UNIT(S): 5000 INJECTION INTRAVENOUS; SUBCUTANEOUS at 13:30

## 2017-10-08 RX ADMIN — Medication 25 MILLIGRAM(S): at 11:19

## 2017-10-08 RX ADMIN — ATORVASTATIN CALCIUM 10 MILLIGRAM(S): 80 TABLET, FILM COATED ORAL at 22:07

## 2017-10-08 RX ADMIN — Medication 81 MILLIGRAM(S): at 11:19

## 2017-10-08 RX ADMIN — SODIUM CHLORIDE 3 MILLILITER(S): 9 INJECTION INTRAMUSCULAR; INTRAVENOUS; SUBCUTANEOUS at 13:09

## 2017-10-08 RX ADMIN — SODIUM CHLORIDE 3 MILLILITER(S): 9 INJECTION INTRAMUSCULAR; INTRAVENOUS; SUBCUTANEOUS at 21:57

## 2017-10-08 RX ADMIN — FAMOTIDINE 20 MILLIGRAM(S): 10 INJECTION INTRAVENOUS at 05:22

## 2017-10-08 RX ADMIN — Medication 100 MILLIGRAM(S): at 18:36

## 2017-10-08 RX ADMIN — LIDOCAINE 1 PATCH: 4 CREAM TOPICAL at 22:17

## 2017-10-08 RX ADMIN — FINASTERIDE 5 MILLIGRAM(S): 5 TABLET, FILM COATED ORAL at 11:19

## 2017-10-08 RX ADMIN — SODIUM CHLORIDE 3 MILLILITER(S): 9 INJECTION INTRAMUSCULAR; INTRAVENOUS; SUBCUTANEOUS at 05:18

## 2017-10-08 RX ADMIN — Medication 25 MILLIGRAM(S): at 05:21

## 2017-10-08 RX ADMIN — Medication 100 MILLIGRAM(S): at 05:21

## 2017-10-08 RX ADMIN — CLOPIDOGREL BISULFATE 75 MILLIGRAM(S): 75 TABLET, FILM COATED ORAL at 11:19

## 2017-10-08 RX ADMIN — Medication 10 MILLIGRAM(S): at 08:22

## 2017-10-08 RX ADMIN — AMIODARONE HYDROCHLORIDE 400 MILLIGRAM(S): 400 TABLET ORAL at 18:36

## 2017-10-08 RX ADMIN — LIDOCAINE 1 PATCH: 4 CREAM TOPICAL at 11:19

## 2017-10-08 RX ADMIN — Medication 25 MILLIGRAM(S): at 23:24

## 2017-10-08 RX ADMIN — FAMOTIDINE 20 MILLIGRAM(S): 10 INJECTION INTRAVENOUS at 18:36

## 2017-10-08 RX ADMIN — HEPARIN SODIUM 5000 UNIT(S): 5000 INJECTION INTRAVENOUS; SUBCUTANEOUS at 22:07

## 2017-10-08 NOTE — PROGRESS NOTE ADULT - SUBJECTIVE AND OBJECTIVE BOX
Patient discussed on morning rounds with Dr. Bland    Operation / Date: MIDCAB 10/5 Normal EF     SUBJECTIVE ASSESSMENT:  71y Male seen at bedside. Patient is doing well with no acute complaints. Pain is well controlled, ambulating on room air, good PO intake.         Vital Signs Last 24 Hrs  T(C): 36.6 (08 Oct 2017 14:01), Max: 36.7 (08 Oct 2017 01:00)  T(F): 97.9 (08 Oct 2017 14:01), Max: 98.1 (08 Oct 2017 01:00)  HR: 74 (08 Oct 2017 13:35) (70 - 90)  BP: 104/69 (08 Oct 2017 13:35) (104/69 - 150/88)  BP(mean): 79 (08 Oct 2017 13:35) (79 - 108)  RR: 18 (08 Oct 2017 13:35) (16 - 20)  SpO2: 98% (08 Oct 2017 13:35) (95% - 98%)  I&O's Detail    07 Oct 2017 07:01  -  08 Oct 2017 07:00  --------------------------------------------------------  IN:    Oral Fluid: 890 mL    sodium chloride 0.9%: 600 mL    Sodium Chloride 0.9% IV Bolus: 500 mL  Total IN: 1990 mL    OUT:    Voided: 2350 mL  Total OUT: 2350 mL    Total NET: -360 mL      08 Oct 2017 07:01  -  08 Oct 2017 15:17  --------------------------------------------------------  IN:    Oral Fluid: 550 mL  Total IN: 550 mL    OUT:    Stool: 1 mL    Voided: 550 mL  Total OUT: 551 mL    Total NET: -1 mL          CHEST TUBE:  No  CHARLES DRAIN:  N  EPICARDIAL WIRES: No  TIE DOWNS: Yes  HOWARD: No    PHYSICAL EXAM:    General: NAD, stable     Neurological: A&O x3 ,no focal deficits     Cardiovascular: RRR, normal S1, S2     Respiratory: CTA b/l    Gastrointestinal: + BS, soft, nontender     Extremities: no edema     Vascular: + pulses b/l    Incision Sites: left mini thoracotomy incision     LABS:                        11.8   7.9   )-----------( 146      ( 08 Oct 2017 06:50 )             34.4       COUMADIN:  No.     PT/INR - ( 07 Oct 2017 06:22 )   PT: 13.8 sec;   INR: 1.24          PTT - ( 07 Oct 2017 06:22 )  PTT:29.3 sec    10-08    141  |  104  |  15  ----------------------------<  91  4.3   |  25  |  1.18    Ca    9.0      08 Oct 2017 06:44  Mg     2.2     10-08        MEDICATIONS  (STANDING):  amiodarone    Tablet 400 milliGRAM(s) Oral every 8 hours  aspirin  chewable 81 milliGRAM(s) Oral daily  atorvastatin 10 milliGRAM(s) Oral at bedtime  clopidogrel Tablet 75 milliGRAM(s) Oral daily  docusate sodium 100 milliGRAM(s) Oral two times a day  famotidine    Tablet 20 milliGRAM(s) Oral two times a day  finasteride 5 milliGRAM(s) Oral daily  heparin  Injectable 5000 Unit(s) SubCutaneous every 8 hours  lidocaine   Patch 1 Patch Transdermal daily  metoprolol 25 milliGRAM(s) Oral every 6 hours  senna 2 Tablet(s) Oral at bedtime  sodium chloride 0.9% lock flush 3 milliLiter(s) IV Push every 8 hours    MEDICATIONS  (PRN):  acetaminophen   Tablet 650 milliGRAM(s) Oral every 6 hours PRN mild pain  magnesium hydroxide Suspension 30 milliLiter(s) Oral daily PRN Constipation  oxyCODONE    5 mG/acetaminophen 325 mG 1 Tablet(s) Oral every 4 hours PRN Moderate Pain (4 - 6)  polyethylene glycol 3350 17 Gram(s) Oral daily PRN Constipation        RADIOLOGY & ADDITIONAL TESTS:    Xray Chest   Portable examination the chest demonstrates cardiomegaly. Left effusion.   Underlying infiltrates cannot be excluded. Dextroscoliosis thoracic spine   with degenerative changes.            A/P: 70 yo male with a history of HTN, HLD, prostate CA, CKD stage 2-3 and CAD s/p PCI to RCA (2006/2011) presented to his cardiologist with c/o severe chest discomfort and sob/dyspnea with activity. 10/5/17 pt underwent MIDCAB, post-op complicated by AFib on POD#1 which converted to NSR with amiodarone PO/IV load. Plan for PCI monday.     Neurovascular: Pain well controlled with current regimen.  -PRN tylenol and percocet     Cardiovascular: Hemodynamically stable. HR controlled. post-op AF now in NSR  -plan for PCI monday - will discuss with Cardiology PA in AM   -continue to monitor BB/HR/Tele   -continue Lopressor 25mg PO q6h, lipitor 10mg PO QD, ASA/Plavix    Respiratory: 02 Sat = 93% on RA.  -Encourage C+DB and Use of IS 10x / hr while awake.  - CXR stable     GI: Stable.  -PPX- pepcid  -pt with good PO Diet    Renal / : continue to trend daily labs,  -Monitor renal function.  -Monitor I/O's.    Endocrine:  no h/o DM or thyroid disease     Hematologic: continue to trend daily labs.   -CBC stable   c/w SQH, SCD     ID: no acute issues   -Temp- afebrile   WBC trending down, 7.9 today       Disposition:  -PCI Monday, Home Tuesday. will confirm in AM

## 2017-10-09 ENCOUNTER — TRANSCRIPTION ENCOUNTER (OUTPATIENT)
Age: 72
End: 2017-10-09

## 2017-10-09 PROBLEM — J90 PLEURAL EFFUSION, LEFT: Status: ACTIVE | Noted: 2017-10-09

## 2017-10-09 PROBLEM — Z95.1 S/P CABG X 1: Status: ACTIVE | Noted: 2017-10-09

## 2017-10-09 PROBLEM — Z09 POSTOP CHECK: Status: ACTIVE | Noted: 2017-10-09

## 2017-10-09 LAB
ANION GAP SERPL CALC-SCNC: 15 MMOL/L — SIGNIFICANT CHANGE UP (ref 5–17)
APTT BLD: 25.8 SEC — LOW (ref 27.5–37.4)
BLD GP AB SCN SERPL QL: NEGATIVE — SIGNIFICANT CHANGE UP
BUN SERPL-MCNC: 16 MG/DL — SIGNIFICANT CHANGE UP (ref 7–23)
CALCIUM SERPL-MCNC: 9.3 MG/DL — SIGNIFICANT CHANGE UP (ref 8.4–10.5)
CHLORIDE SERPL-SCNC: 100 MMOL/L — SIGNIFICANT CHANGE UP (ref 96–108)
CO2 SERPL-SCNC: 24 MMOL/L — SIGNIFICANT CHANGE UP (ref 22–31)
CREAT SERPL-MCNC: 1.17 MG/DL — SIGNIFICANT CHANGE UP (ref 0.5–1.3)
GLUCOSE SERPL-MCNC: 100 MG/DL — HIGH (ref 70–99)
HCT VFR BLD CALC: 37 % — LOW (ref 39–50)
HGB BLD-MCNC: 12.4 G/DL — LOW (ref 13–17)
INR BLD: 1.12 — SIGNIFICANT CHANGE UP (ref 0.88–1.16)
MAGNESIUM SERPL-MCNC: 2.2 MG/DL — SIGNIFICANT CHANGE UP (ref 1.6–2.6)
MCHC RBC-ENTMCNC: 31.3 PG — SIGNIFICANT CHANGE UP (ref 27–34)
MCHC RBC-ENTMCNC: 33.5 G/DL — SIGNIFICANT CHANGE UP (ref 32–36)
MCV RBC AUTO: 93.4 FL — SIGNIFICANT CHANGE UP (ref 80–100)
PHOSPHATE SERPL-MCNC: 2.2 MG/DL — LOW (ref 2.5–4.5)
PLATELET # BLD AUTO: 224 K/UL — SIGNIFICANT CHANGE UP (ref 150–400)
POTASSIUM SERPL-MCNC: 3.6 MMOL/L — SIGNIFICANT CHANGE UP (ref 3.5–5.3)
POTASSIUM SERPL-SCNC: 3.6 MMOL/L — SIGNIFICANT CHANGE UP (ref 3.5–5.3)
PROTHROM AB SERPL-ACNC: 12.5 SEC — SIGNIFICANT CHANGE UP (ref 9.8–12.7)
RBC # BLD: 3.96 M/UL — LOW (ref 4.2–5.8)
RBC # FLD: 13.8 % — SIGNIFICANT CHANGE UP (ref 10.3–16.9)
RH IG SCN BLD-IMP: POSITIVE — SIGNIFICANT CHANGE UP
SODIUM SERPL-SCNC: 139 MMOL/L — SIGNIFICANT CHANGE UP (ref 135–145)
WBC # BLD: 8.7 K/UL — SIGNIFICANT CHANGE UP (ref 3.8–10.5)
WBC # FLD AUTO: 8.7 K/UL — SIGNIFICANT CHANGE UP (ref 3.8–10.5)

## 2017-10-09 PROCEDURE — 92978 ENDOLUMINL IVUS OCT C 1ST: CPT | Mod: 26,LM

## 2017-10-09 PROCEDURE — 92928 PRQ TCAT PLMT NTRAC ST 1 LES: CPT | Mod: LM

## 2017-10-09 PROCEDURE — 71010: CPT | Mod: 26

## 2017-10-09 PROCEDURE — 93010 ELECTROCARDIOGRAM REPORT: CPT

## 2017-10-09 RX ORDER — CLOPIDOGREL BISULFATE 75 MG/1
300 TABLET, FILM COATED ORAL ONCE
Qty: 0 | Refills: 0 | Status: COMPLETED | OUTPATIENT
Start: 2017-10-09 | End: 2017-10-09

## 2017-10-09 RX ORDER — SODIUM CHLORIDE 9 MG/ML
500 INJECTION INTRAMUSCULAR; INTRAVENOUS; SUBCUTANEOUS
Qty: 0 | Refills: 0 | Status: DISCONTINUED | OUTPATIENT
Start: 2017-10-09 | End: 2017-10-10

## 2017-10-09 RX ORDER — POTASSIUM CHLORIDE 20 MEQ
40 PACKET (EA) ORAL ONCE
Qty: 0 | Refills: 0 | Status: COMPLETED | OUTPATIENT
Start: 2017-10-09 | End: 2017-10-09

## 2017-10-09 RX ADMIN — Medication 81 MILLIGRAM(S): at 11:04

## 2017-10-09 RX ADMIN — FAMOTIDINE 20 MILLIGRAM(S): 10 INJECTION INTRAVENOUS at 05:02

## 2017-10-09 RX ADMIN — AMIODARONE HYDROCHLORIDE 400 MILLIGRAM(S): 400 TABLET ORAL at 19:26

## 2017-10-09 RX ADMIN — Medication 25 MILLIGRAM(S): at 19:25

## 2017-10-09 RX ADMIN — AMIODARONE HYDROCHLORIDE 400 MILLIGRAM(S): 400 TABLET ORAL at 11:04

## 2017-10-09 RX ADMIN — Medication 100 MILLIGRAM(S): at 19:26

## 2017-10-09 RX ADMIN — AMIODARONE HYDROCHLORIDE 400 MILLIGRAM(S): 400 TABLET ORAL at 04:55

## 2017-10-09 RX ADMIN — SENNA PLUS 2 TABLET(S): 8.6 TABLET ORAL at 21:37

## 2017-10-09 RX ADMIN — HEPARIN SODIUM 5000 UNIT(S): 5000 INJECTION INTRAVENOUS; SUBCUTANEOUS at 05:02

## 2017-10-09 RX ADMIN — Medication 25 MILLIGRAM(S): at 11:04

## 2017-10-09 RX ADMIN — SODIUM CHLORIDE 3 MILLILITER(S): 9 INJECTION INTRAMUSCULAR; INTRAVENOUS; SUBCUTANEOUS at 13:33

## 2017-10-09 RX ADMIN — FAMOTIDINE 20 MILLIGRAM(S): 10 INJECTION INTRAVENOUS at 19:25

## 2017-10-09 RX ADMIN — CLOPIDOGREL BISULFATE 75 MILLIGRAM(S): 75 TABLET, FILM COATED ORAL at 11:04

## 2017-10-09 RX ADMIN — SODIUM CHLORIDE 75 MILLILITER(S): 9 INJECTION INTRAMUSCULAR; INTRAVENOUS; SUBCUTANEOUS at 19:18

## 2017-10-09 RX ADMIN — SODIUM CHLORIDE 3 MILLILITER(S): 9 INJECTION INTRAMUSCULAR; INTRAVENOUS; SUBCUTANEOUS at 21:07

## 2017-10-09 RX ADMIN — CLOPIDOGREL BISULFATE 300 MILLIGRAM(S): 75 TABLET, FILM COATED ORAL at 12:41

## 2017-10-09 RX ADMIN — FINASTERIDE 5 MILLIGRAM(S): 5 TABLET, FILM COATED ORAL at 11:04

## 2017-10-09 RX ADMIN — SODIUM CHLORIDE 3 MILLILITER(S): 9 INJECTION INTRAMUSCULAR; INTRAVENOUS; SUBCUTANEOUS at 04:52

## 2017-10-09 RX ADMIN — Medication 40 MILLIEQUIVALENT(S): at 08:49

## 2017-10-09 RX ADMIN — ATORVASTATIN CALCIUM 10 MILLIGRAM(S): 80 TABLET, FILM COATED ORAL at 21:37

## 2017-10-09 RX ADMIN — Medication 25 MILLIGRAM(S): at 05:02

## 2017-10-09 NOTE — PROGRESS NOTE ADULT - SUBJECTIVE AND OBJECTIVE BOX
Patient discussed on morning rounds with Dr. Miller    Operation / Date:  MIDCAB, EF nl 10/5/17    SUBJECTIVE ASSESSMENT:  71y Male seen and examined.  Today patient reports minor incisional pain.  He is ambulating in hallway, using IS, tolerating PO diet and having normal BMs.  He denies fever, chest pain/pressure, SOB, abdominal pain, N/V/D/C.        Vital Signs Last 24 Hrs  T(C): 36.4 (09 Oct 2017 09:51), Max: 37.6 (08 Oct 2017 21:50)  T(F): 97.5 (09 Oct 2017 09:51), Max: 99.7 (08 Oct 2017 21:50)  HR: 73 (09 Oct 2017 11:05) (68 - 74)  BP: 133/79 (09 Oct 2017 11:05) (104/69 - 137/76)  BP(mean): 94 (09 Oct 2017 11:05) (79 - 108)  RR: 16 (09 Oct 2017 11:05) (16 - 20)  SpO2: 96% (09 Oct 2017 09:17) (96% - 98%)  I&O's Detail    08 Oct 2017 07:01  -  09 Oct 2017 07:00  --------------------------------------------------------  IN:    Oral Fluid: 900 mL  Total IN: 900 mL    OUT:    Stool: 1 mL    Voided: 1250 mL  Total OUT: 1251 mL    Total NET: -351 mL          CHEST TUBE:  No.  CHARLES DRAIN:  No.  EPICARDIAL WIRES: No.  TIE DOWNS: Yes  HOWARD: No.    PHYSICAL EXAM:    General:  sitting comfortably in bed, no acute distress    Neurological: awake alert and oriented, no neuro deficits     Cardiovascular: S1S2, RRR, no murmurs heard at this time     Respiratory: clear and equal breath sounds bilaterally, no wheeze/ rhonchi/ rales. Pulling ~1500cc IS    Gastrointestinal: +BS, soft nontender nondistended    Extremities: warm and well perfused, no edema, no calf tenderness     Vascular: 2+ radial and DP pulses     Incision Sites: L MIDCAB: CDI, no erythema drainage tenderness     LABS:                        12.4   8.7   )-----------( 224      ( 09 Oct 2017 06:28 )             37.0       COUMADIN:  No.     PT/INR - ( 09 Oct 2017 06:28 )   PT: 12.5 sec;   INR: 1.12          PTT - ( 09 Oct 2017 06:28 )  PTT:25.8 sec    10-09    139  |  100  |  16  ----------------------------<  100<H>  3.6   |  24  |  1.17    Ca    9.3      09 Oct 2017 06:28  Phos  2.2     10-09  Mg     2.2     10-09            MEDICATIONS  (STANDING):  amiodarone    Tablet 400 milliGRAM(s) Oral every 8 hours  aspirin  chewable 81 milliGRAM(s) Oral daily  atorvastatin 10 milliGRAM(s) Oral at bedtime  clopidogrel Tablet 75 milliGRAM(s) Oral daily  clopidogrel Tablet 300 milliGRAM(s) Oral once  docusate sodium 100 milliGRAM(s) Oral two times a day  famotidine    Tablet 20 milliGRAM(s) Oral two times a day  finasteride 5 milliGRAM(s) Oral daily  heparin  Injectable 5000 Unit(s) SubCutaneous every 8 hours  lidocaine   Patch 1 Patch Transdermal daily  metoprolol 25 milliGRAM(s) Oral every 6 hours  senna 2 Tablet(s) Oral at bedtime  sodium chloride 0.9% lock flush 3 milliLiter(s) IV Push every 8 hours    MEDICATIONS  (PRN):  acetaminophen   Tablet 650 milliGRAM(s) Oral every 6 hours PRN mild pain  magnesium hydroxide Suspension 30 milliLiter(s) Oral daily PRN Constipation  oxyCODONE    5 mG/acetaminophen 325 mG 1 Tablet(s) Oral every 4 hours PRN Moderate Pain (4 - 6)  polyethylene glycol 3350 17 Gram(s) Oral daily PRN Constipation        RADIOLOGY & ADDITIONAL TESTS:  < from: Xray Chest 1 View AP -PORTABLE-Routine (10.09.17 @ 06:59) >  FINDINGS: Stable heart size. Increased left pleural effusion. Patchy   infiltrates in the left upper lung.    IMPRESSION:  Increased left pleural effusion. Patchy infiltrates in the left upper   lung.    < end of copied text >

## 2017-10-09 NOTE — DISCHARGE NOTE ADULT - CARE PLAN
Goal:	post CABG/PCI  Instructions for follow-up, activity and diet:	-Please follow up with Dr. Parham on ___.  The office is located at [facility], [facility], 4th floor. Call us with any questions  #[phone].    -Walk daily as tolerated and use your incentive spirometer every hour.  -No driving or strenuous activity/exercise for 6 weeks, or until cleared by your surgeon.  -Gently clean your incisions with anti-bacterial soap and water, pat dry.  You may leave them open to air.  -Call your doctor if you have shortness of breath, chest pain not relieved by pain medication, dizziness, fever >101.5, or increased redness, swelling or drainage from incisions. Goal:	post CABG/PCI  Instructions for follow-up, activity and diet:	-Please follow up with Dr. Parham on 10/17/17 at 11:00am.  The office is located at [facility], [facility], 4th floor. Call us with any questions #[phone].    -Dr. Tanner would also like to follow-up with you on 10/17/17 at 1:30pm.  His contact information and office location are included in your discharge paperwork.   -Walk daily as tolerated and use your incentive spirometer every hour.  -No driving or strenuous activity/exercise for 6 weeks, or until cleared by your surgeon.  -Gently clean your incisions with anti-bacterial soap and water, pat dry.  You may leave them open to air.  -Call your doctor if you have shortness of breath, chest pain not relieved by pain medication, dizziness, fever >101.5, or increased redness, swelling or drainage from incisions. Principal Discharge DX:	CAD (coronary artery disease)  Goal:	post CABG/PCI  Instructions for follow-up, activity and diet:	-Please follow up with Dr. Parham on 10/17/17 at 11:00am.  The office is located at [facility], [facility], 4th floor. Call us with any questions #[phone].    -Dr. Tanner would also like to follow-up with you on 10/17/17 at 1:30pm.  His contact information and office location are included in your discharge paperwork.     -Please follow-up with your primary care provider within 2 weeks of discharge.   -Walk daily as tolerated and use your incentive spirometer every hour.  -No driving or strenuous activity/exercise for 6 weeks, or until cleared by your surgeon.  -Gently clean your incisions with anti-bacterial soap and water, pat dry.  You may leave them open to air.  -Call your doctor if you have shortness of breath, chest pain not relieved by pain medication, dizziness, fever >101.5, or increased redness, swelling or drainage from incisions.

## 2017-10-09 NOTE — DISCHARGE NOTE ADULT - MEDICATION SUMMARY - MEDICATIONS TO TAKE
I will START or STAY ON the medications listed below when I get home from the hospital:    finasteride 5 mg oral tablet  -- 1 tab(s) by mouth once a day  -- Indication: For BPH    aspirin 81 mg oral tablet  -- 1 tab(s) by mouth once a day  -- Indication: For Blood thinner    acetaminophen 325 mg oral tablet  -- 1 tab(s) by mouth every 6 hours, As Needed -mild pain MDD:4  -- Indication: For Pain    oxyCODONE-acetaminophen 5 mg-325 mg oral tablet  -- 1 tab(s) by mouth every 4 hours, As needed, Moderate Pain (4 - 6) MDD:6  -- Indication: For Severe pain    amiodarone 200 mg oral tablet  -- 1 tab(s) by mouth once a day  -- Indication: For Heart rate control    pravastatin 40 mg oral tablet  -- 1 tab(s) by mouth once a day  -- Indication: For Cholesterol    Plavix 75 mg oral tablet  -- 1 tab(s) by mouth once a day  -- Indication: For Blood thinner    metoprolol tartrate 50 mg oral tablet  -- 1 tab(s) by mouth 2 times a day   -- It is very important that you take or use this exactly as directed.  Do not skip doses or discontinue unless directed by your doctor.  May cause drowsiness.  Alcohol may intensify this effect.  Use care when operating dangerous machinery.  Some non-prescription drugs may aggravate your condition.  Read all labels carefully.  If a warning appears, check with your doctor before taking.  Take with food or milk.  This drug may impair the ability to drive or operate machinery.  Use care until you become familiar with its effects.    -- Indication: For Blood pressure    famotidine 20 mg oral tablet  -- 1 tab(s) by mouth 2 times a day  -- Indication: For Stomach health    docusate sodium 100 mg oral capsule  -- 1 cap(s) by mouth 2 times a day, As Needed -for constipation   -- Indication: For Stool softener    senna oral tablet  -- 2 tab(s) by mouth once a day (at bedtime), As Needed -for constipation   -- Indication: For Stool softener

## 2017-10-09 NOTE — DISCHARGE NOTE ADULT - MEDICATION SUMMARY - MEDICATIONS TO STOP TAKING
I will STOP taking the medications listed below when I get home from the hospital:    acebutolol 200 mg oral capsule  -- 1 cap(s) by mouth once a day    ketorolac 10 mg oral tablet  -- 1 tab(s) by mouth every 8 hours  -- It is very important that you take or use this exactly as directed.  Do not skip doses or discontinue unless directed by your doctor.  May cause drowsiness or dizziness.  Obtain medical advice before taking any non-prescription drugs as some may affect the action of this medication.  Take with food or milk.    ramipril 10 mg oral capsule  -- 1 cap(s) by mouth once a day

## 2017-10-09 NOTE — DISCHARGE NOTE ADULT - PATIENT PORTAL LINK FT
“You can access the Follow[employer] Patient Portal, offered by [facility], by registering with the following website: [email or web address]”

## 2017-10-09 NOTE — DISCHARGE NOTE ADULT - CARE PROVIDERS DIRECT ADDRESSES
[email or web address]

## 2017-10-09 NOTE — PROGRESS NOTE ADULT - SUBJECTIVE AND OBJECTIVE BOX
Interventional Cardiology PA Precath Note      HPI:  72 yo male with a history of HTN, HLD, prostate CA, CKD stage 2-3 and CAD s/p PCI to RCA (2006 & 2011 2/2 ISR) presented to his cardiologist with c/o episode of palpitations occurring under exertion. In January 2017 he had unrevealing nuclear stress test and given concern for progression of CAD he was referred for cath. On 9/20/17 cardiac cath revealed patent RCA stents, distal left main stenosis 70-80%, pLAD 30-40%, D2 70%, oLCx 50-60% jeopardizing one marginal branch and the Cx marginal is jeopardized by LM stenosis, EF 55% with mild anterior and apical wall Hypokinesis. Patient was recommended for CABG and patient presented electively to CTS Dr Parham on 10/5 and underwent an uncomplicated robotic assisted MIDCAB (LIMA to LAD) with a normal EF. He arrived to the CTICU extubated on no pressor/inotropic support. [facility] P[facility] and tolerated BB. Post-op complicated by AFib on POD#1 which converted to NSR with amiodarone PO/IV load.  CT was removed POD#1 without any complications. Pt currently feels well with only chest discomfort related to surgical site.  He is now referred for Hybrid PCI of residual Left coronary system stenosis.       Radiology:  X-ray: < from: Xray Chest 1 View AP -PORTABLE-Routine (10.09.17 @ 06:59) >  Increased left pleural effusion. Patchy infiltrates in the left upper   lung.    < end of copied text >    Prior Cath Hx: 2011 @[place]: LM normal, mild diffuse LAD disease, 50% ostial LCx, 80% RCA ISR. Successful [place] RCA.     ECHO 1/2017: normal EF, mild AI, mild MR, mild TR.     PMHx:   CAD (Coronary Artery Disease)    Carpal Tunnel Syndrome  right and left  CKD (chronic kidney disease) stage 2, GFR 60-89 ml/min    GERD (gastroesophageal reflux disease)    HLD (hyperlipidemia)    HTN (Hypertension)    Prostate cancer    Stented coronary artery.     Past Surgical History:  Bilateral Inguinal Hernia  repair 2005  Coronary Artery Stent Placement  6/26/06  [facility].  Drug eluting stent  RCA  S/P Carpal Tunnel Release  right and left 2007.     Family History:  Father  Still living? Unknown  Family history of heart disease, Age at diagnosis: Age Unknown     Mother  Still living? Unknown  Family history of heart disease, Age at diagnosis: Age Unknown.     Social History:  Social History (marital status, living situation, occupation, tobacco use, alcohol and drug use, and sexual history): , retired, lives in [place], former smoker, social alcohol use	    ALLERGIES: No Known Allergies    MEDS:   acetaminophen   Tablet 650 milliGRAM(s) Oral every 6 hours PRN  amiodarone    Tablet 400 milliGRAM(s) Oral every 8 hours  aspirin  chewable 81 milliGRAM(s) Oral daily  atorvastatin 10 milliGRAM(s) Oral at bedtime  clopidogrel Tablet 75 milliGRAM(s) Oral daily  clopidogrel Tablet 300 milliGRAM(s) Oral once  docusate sodium 100 milliGRAM(s) Oral two times a day  famotidine    Tablet 20 milliGRAM(s) Oral two times a day  finasteride 5 milliGRAM(s) Oral daily  heparin  Injectable 5000 Unit(s) SubCutaneous every 8 hours  lidocaine   Patch 1 Patch Transdermal daily  magnesium hydroxide Suspension 30 milliLiter(s) Oral daily PRN  metoprolol 25 milliGRAM(s) Oral every 6 hours  oxyCODONE    5 mG/acetaminophen 325 mG 1 Tablet(s) Oral every 4 hours PRN  polyethylene glycol 3350 17 Gram(s) Oral daily PRN  senna 2 Tablet(s) Oral at bedtime  sodium chloride 0.9% lock flush 3 milliLiter(s) IV Push every 8 hours    T(C): 36.4 (10-09-17 @ 09:51), Max: 37.6 (10-08-17 @ 21:50)  HR: 73 (10-09-17 @ 11:05) (68 - 74)  BP: 133/79 (10-09-17 @ 11:05) (104/69 - 137/76)  RR: 16 (10-09-17 @ 11:05) (16 - 20)  SpO2: 96% (10-09-17 @ 09:17) (96% - 98%)  Wt(kg): --  Tele: NSR  HEENT: NCAT, EOMI, PERRLA  NECK: No JVD, No carotid bruits B/L, mild ecchymosis rt IJ area;   PULM:  decreased BS at Left base otherwise clear bilateraly;  No W/R/R  CARD: RRR, +S1, +S2, No M/R/G; Lt chest incision site minimally tender, no erythema. CT site c/d/i  ABD: soft, ND, NT, +BS no masses  EXT: Warm, no pedal edema; Rt radial site intact without hematoma; Left radial positive roe's test, good cap refill < 3sec  NEURO: A & O x 3, no focal neurologic deficits  PULSES:	B	    R	      FEM         	          DP      PT  Right		  +2            +2   NO Bruit        +2      +2	    Left		  +2           +2    No Bruit         +2          +2                           12.4   8.7   )-----------( 224      ( 09 Oct 2017 06:28 )             37.0     10-09    139  |  100  |  16  ----------------------------<  100<H>  3.6   |  24  |  1.17    Ca    9.3      09 Oct 2017 06:28  Phos  2.2     10-09  Mg     2.2     10-09    EKG:	< from: 12 Lead ECG (10.06.17 @ 13:15) >  Atrial fibrillation with premature ventricular or aberrantly conducted complexes  Minimal voltage criteria for LVH, may be normal variant  Early repolarization    < end of copied text >  				  ASA __3___				Mallampati class: _2________	            Anginal Class: __3_______  A/P:    72 yo male with a history of HTN, HLD, prostate CA, CKD stage 2-3 and CAD s/p PCI to RCA (2006 & 2011 2/2 ISR) with exertional angina class 3 found to have multivessel disease now s/p midCAB LIMA-LAD 10/5/17 referred for hybrid PCI to revascularize residual disease fo Left coronary system.   -NPO after light lunch for PCI today with Dr aTnner  -Continue ASA, Plavix-give additional 300mg load for planned PCI today given just restarted on 10/5/17. Continue BB, Statin.    -Consider reassessment of Left pleural effusion given XRay and clinical exam findings - D/W CTS team  -gentle hydration precath given CKD hx TBD by CTS team (Cr stable @ 1.17 today)   -Reinforced importance of DAPT therapy post PCI with patient who understands and reports no compliance issue.   -Case D/w Dr Tanner and CTS Team        Sedation Plan:   Moderate     Patient Is Suitable Candidate For Sedation?     Yes       Risks & benefits of procedure and sedation and risks and benefits for the alternative therapy have been explained to the patient in layman’s terms including but not limited to: allergic reaction, bleeding, infection, arrhythmia, respiratory compromise, renal and vascular compromise, limb damage, MI, CVA, emergent CABG/Vascular Surgery and death. Informed consent obtained and in chart.

## 2017-10-09 NOTE — PROGRESS NOTE ADULT - ASSESSMENT
A/P:  71 year old male PMHx HTn, HLD, prostate Ca, CKD stage II-III, CAD s/p PCI to RCA (2006/2011) presented to his cardiologist with chest pain.  Cardiac cath 9/20/17 revealed distal left main stenosis, normal LV function. He was seen as an outpatient by Dr. Parham and presented for elective surgery. Patient underwent uncomplicated MIDCAB on 10/5/17, complicated by post-op Afib, on amio and converted to NSR.  Patient arrived to CT ICU extubated.  POD1 BB started and transferred to [facility].  Patient has been in NSR since POD2.  Today POD4, patient getting PCI.     Neurovascular: No delirium. Pain well controlled with current regimen.  -Continue tylenol and Percocet PRN pain    Cardiovascular: Hemodynamically stable. HR controlled.  s/p MIDCAB- continue asa 81mg, plavix 75mg daily, metoprolol 25mg q6, atorvastatin 10mg QHS  -post op Afib- continue amio. NSR since 10/7/17  -PCI today- Plavix loaded  -continue to monitor BR/HR/Tele    Respiratory: 02 Sat = 96% on RA.  -Encourage ambulation, C+DB and Use of IS 10x / hr while awake.  -CXR- L pleural effusion- u/s at bedside to evaluate    GI: Stable.  -NPO for PCI  -continue famotidine 20mg BID for GI PPX.  -continue colace, senna for bowel regimen  -miralax PRN constipation    Renal / : BUN/Cr 16/1.17  -hx CKD stage II-III -Monitor renal function, Monitor I/O's.  -continue finasteride BPH    Endocrine:  stable  -A1c 5.0  -TSH 0.724    Hematologic: H&H 12.4/37  -f/u CBC.    ID: WBC 8.7, afebrile  -Observe for SIRS/Sepsis Syndrome.    Prophylaxis:  -DVT prophylaxis with 5000 SubQ Heparin q8h.  -SCD's    Disposition:  PCI today, home tomorrow A/P:  71 year old male PMHx HTn, HLD, prostate Ca, CKD stage II-III, CAD s/p PCI to RCA (2006/2011) presented to his cardiologist with chest pain.  Cardiac cath 9/20/17 revealed distal left main stenosis, normal LV function. He was seen as an outpatient by Dr. Parham and presented for elective surgery. Patient underwent uncomplicated MIDCAB on 10/5/17, complicated by post-op Afib, on amio and converted to NSR.  Patient arrived to CT ICU extubated.  POD1 BB started and transferred to [facility].  Patient has been in NSR since POD2.  Today POD4, patient getting PCI.     Neurovascular: No delirium. Pain well controlled with current regimen.  -Continue tylenol and Percocet PRN pain    Cardiovascular: Hemodynamically stable. HR controlled.  s/p MIDCAB- continue asa 81mg, plavix 75mg daily, metoprolol 25mg q6, atorvastatin 10mg QHS  -post op Afib- continue amio (9/12) doses. NSR since 10/7/17  -PCI today- Plavix loaded  -continue to monitor BR/HR/Tele    Respiratory: 02 Sat = 96% on RA.  -Encourage ambulation, C+DB and Use of IS 10x / hr while awake.  -CXR- L pleural effusion- u/s at bedside to evaluate    GI: Stable.  -NPO for PCI  -continue famotidine 20mg BID for GI PPX.  -continue colace, senna for bowel regimen  -miralax PRN constipation    Renal / : BUN/Cr 16/1.17  -hx CKD stage II-III -Monitor renal function, Monitor I/O's.  -continue finasteride BPH    Endocrine:  stable  -A1c 5.0  -TSH 0.724    Hematologic: H&H 12.4/37  -f/u CBC.    ID: WBC 8.7, afebrile  -Observe for SIRS/Sepsis Syndrome.    Prophylaxis:  -DVT prophylaxis with 5000 SubQ Heparin q8h.  -SCD's    Disposition:  PCI today, home tomorrow

## 2017-10-09 NOTE — DISCHARGE NOTE ADULT - PLAN OF CARE
post CABG/PCI -Please follow up with Dr. Parham on ___.  The office is located at [facility], [facility], 4th floor. Call us with any questions  #[phone].    -Walk daily as tolerated and use your incentive spirometer every hour.  -No driving or strenuous activity/exercise for 6 weeks, or until cleared by your surgeon.  -Gently clean your incisions with anti-bacterial soap and water, pat dry.  You may leave them open to air.  -Call your doctor if you have shortness of breath, chest pain not relieved by pain medication, dizziness, fever >101.5, or increased redness, swelling or drainage from incisions. -Please follow up with Dr. Parham on 10/17/17 at 11:00am.  The office is located at [facility], [facility], 4th floor. Call us with any questions #[phone].    -Dr. Tanner would also like to follow-up with you on 10/17/17 at 1:30pm.  His contact information and office location are included in your discharge paperwork.   -Walk daily as tolerated and use your incentive spirometer every hour.  -No driving or strenuous activity/exercise for 6 weeks, or until cleared by your surgeon.  -Gently clean your incisions with anti-bacterial soap and water, pat dry.  You may leave them open to air.  -Call your doctor if you have shortness of breath, chest pain not relieved by pain medication, dizziness, fever >101.5, or increased redness, swelling or drainage from incisions. -Please follow up with Dr. Parham on 10/17/17 at 11:00am.  The office is located at [facility], [facility], 4th floor. Call us with any questions #[phone].    -Dr. Tanner would also like to follow-up with you on 10/17/17 at 1:30pm.  His contact information and office location are included in your discharge paperwork.     -Please follow-up with your primary care provider within 2 weeks of discharge.   -Walk daily as tolerated and use your incentive spirometer every hour.  -No driving or strenuous activity/exercise for 6 weeks, or until cleared by your surgeon.  -Gently clean your incisions with anti-bacterial soap and water, pat dry.  You may leave them open to air.  -Call your doctor if you have shortness of breath, chest pain not relieved by pain medication, dizziness, fever >101.5, or increased redness, swelling or drainage from incisions.

## 2017-10-09 NOTE — DISCHARGE NOTE ADULT - HOSPITAL COURSE
72 yo male with a history of HTN, HLD, prostate CA, CKD stage 2-3 and CAD s/p PCI to RCA (2006/2011) presented to his cardiologist with c/o severe chest discomfort and sob/dyspnea with activity. He described the left sided chest pain/pressure similar to prior to stent in 2011. In January 2017he had unrevealing nuclear stress test. 9/20/17 cardiac cath revealed patent RCA stents, distal left main stenosis, normal LV function. Patient presented on 10/5 and underwent an uncomplicated robotic assisted MIDCAB (LIMA to LAD) with a normal EF. He arrived to the CTICU extubated on no pressor/ionotripic support. He was transferred to [facility] POD#1 and tolerated BB. CT was removed POD#1 without any complications. Patient underwent a planned cath on 10/9 and had a PCI placed with Dr. Tanner. Patient is now doing well............ 72 yo male with a history of HTN, HLD, prostate CA, CKD stage 2-3 and CAD s/p PCI to RCA (2006/2011) presented to his cardiologist with c/o severe chest discomfort and sob/dyspnea with activity. He described the left sided chest pain/pressure similar to prior to stent in 2011. In January 2017 he had unrevealing nuclear stress test. 9/20/17 cardiac cath revealed patent RCA stents, distal left main stenosis, normal LV function. Patient presented on 10/5 and underwent an uncomplicated robotic assisted MIDCAB (LIMA to LAD) with a normal EF. He arrived to the CTICU extubated on no pressor/ionotripic support. He was transferred to [facility] POD#1 and tolerated BB. CT was removed POD#1 without any complications. Patient underwent a planned cath on 10/9 and had a PCI placed with Dr. Tanner. On 10/10, AM CXR suspicious for L pleural effusion which was confirmed with bedside ultrasound.  He underwent bedside thoracentesis which drained 700cc serosanguinous fluid with repeat CXR stable.  He was medically cleared for discharge to home today with plan for outpatient follow-up with Dr. Parham and Dr. Tanner.  Medications and instructions discussed prior to discharge.

## 2017-10-09 NOTE — DISCHARGE NOTE ADULT - CARE PROVIDER_API CALL
Nabor Parham), Cardiovascular Surgery  [address]  [address]th [place] [address]  Phone: [phone]  Fax: [phone]    Slim Tanner (MD), Cardiology; Interventional Cardiology  [address] [address]  Phone: [phone]  Fax: [phone]

## 2017-10-09 NOTE — DISCHARGE NOTE ADULT - NSFTFSERV1RD_GEN_ALL_CORE
rehabilitation services/observation and assessment/teaching and training/medication teaching and assessment

## 2017-10-10 VITALS — TEMPERATURE: 97 F

## 2017-10-10 PROBLEM — N18.2 CHRONIC KIDNEY DISEASE, STAGE 2 (MILD): Chronic | Status: ACTIVE | Noted: 2017-10-03

## 2017-10-10 PROBLEM — E78.5 HYPERLIPIDEMIA, UNSPECIFIED: Chronic | Status: ACTIVE | Noted: 2017-10-03

## 2017-10-10 PROBLEM — K21.9 GASTRO-ESOPHAGEAL REFLUX DISEASE WITHOUT ESOPHAGITIS: Chronic | Status: ACTIVE | Noted: 2017-10-03

## 2017-10-10 PROBLEM — Z95.828 PRESENCE OF STENT IN ARTERY: Status: ACTIVE | Noted: 2017-10-10

## 2017-10-10 PROBLEM — Z95.5 PRESENCE OF CORONARY ANGIOPLASTY IMPLANT AND GRAFT: Chronic | Status: ACTIVE | Noted: 2017-10-03

## 2017-10-10 PROBLEM — C61 MALIGNANT NEOPLASM OF PROSTATE: Chronic | Status: ACTIVE | Noted: 2017-10-03

## 2017-10-10 LAB
ANION GAP SERPL CALC-SCNC: 14 MMOL/L — SIGNIFICANT CHANGE UP (ref 5–17)
BUN SERPL-MCNC: 19 MG/DL — SIGNIFICANT CHANGE UP (ref 7–23)
CALCIUM SERPL-MCNC: 9.3 MG/DL — SIGNIFICANT CHANGE UP (ref 8.4–10.5)
CHLORIDE SERPL-SCNC: 104 MMOL/L — SIGNIFICANT CHANGE UP (ref 96–108)
CO2 SERPL-SCNC: 23 MMOL/L — SIGNIFICANT CHANGE UP (ref 22–31)
CREAT SERPL-MCNC: 1.08 MG/DL — SIGNIFICANT CHANGE UP (ref 0.5–1.3)
GLUCOSE SERPL-MCNC: 97 MG/DL — SIGNIFICANT CHANGE UP (ref 70–99)
HCT VFR BLD CALC: 34.3 % — LOW (ref 39–50)
HCT VFR BLD CALC: 38.6 % — LOW (ref 39–50)
HGB BLD-MCNC: 12 G/DL — LOW (ref 13–17)
HGB BLD-MCNC: 12.7 G/DL — LOW (ref 13–17)
MAGNESIUM SERPL-MCNC: 2.2 MG/DL — SIGNIFICANT CHANGE UP (ref 1.6–2.6)
MCHC RBC-ENTMCNC: 30.8 PG — SIGNIFICANT CHANGE UP (ref 27–34)
MCHC RBC-ENTMCNC: 31.9 PG — SIGNIFICANT CHANGE UP (ref 27–34)
MCHC RBC-ENTMCNC: 32.9 G/DL — SIGNIFICANT CHANGE UP (ref 32–36)
MCHC RBC-ENTMCNC: 35 G/DL — SIGNIFICANT CHANGE UP (ref 32–36)
MCV RBC AUTO: 91.2 FL — SIGNIFICANT CHANGE UP (ref 80–100)
MCV RBC AUTO: 93.7 FL — SIGNIFICANT CHANGE UP (ref 80–100)
PHOSPHATE SERPL-MCNC: 2.7 MG/DL — SIGNIFICANT CHANGE UP (ref 2.5–4.5)
PLATELET # BLD AUTO: 194 K/UL — SIGNIFICANT CHANGE UP (ref 150–400)
PLATELET # BLD AUTO: 228 K/UL — SIGNIFICANT CHANGE UP (ref 150–400)
POTASSIUM SERPL-MCNC: 4.1 MMOL/L — SIGNIFICANT CHANGE UP (ref 3.5–5.3)
POTASSIUM SERPL-SCNC: 4.1 MMOL/L — SIGNIFICANT CHANGE UP (ref 3.5–5.3)
RBC # BLD: 3.76 M/UL — LOW (ref 4.2–5.8)
RBC # BLD: 4.12 M/UL — LOW (ref 4.2–5.8)
RBC # FLD: 13.3 % — SIGNIFICANT CHANGE UP (ref 10.3–16.9)
RBC # FLD: 13.6 % — SIGNIFICANT CHANGE UP (ref 10.3–16.9)
SODIUM SERPL-SCNC: 141 MMOL/L — SIGNIFICANT CHANGE UP (ref 135–145)
WBC # BLD: 7.7 K/UL — SIGNIFICANT CHANGE UP (ref 3.8–10.5)
WBC # BLD: 7.9 K/UL — SIGNIFICANT CHANGE UP (ref 3.8–10.5)
WBC # FLD AUTO: 7.7 K/UL — SIGNIFICANT CHANGE UP (ref 3.8–10.5)
WBC # FLD AUTO: 7.9 K/UL — SIGNIFICANT CHANGE UP (ref 3.8–10.5)

## 2017-10-10 PROCEDURE — 71010: CPT | Mod: 26,59

## 2017-10-10 PROCEDURE — 71020: CPT | Mod: 26

## 2017-10-10 PROCEDURE — 93010 ELECTROCARDIOGRAM REPORT: CPT

## 2017-10-10 RX ORDER — RAMIPRIL 5 MG
1 CAPSULE ORAL
Qty: 0 | Refills: 0 | COMMUNITY

## 2017-10-10 RX ORDER — METOPROLOL TARTRATE 25 MG/1
25 TABLET, FILM COATED ORAL DAILY
Qty: 30 | Refills: 0 | Status: COMPLETED | COMMUNITY
End: 2017-10-10

## 2017-10-10 RX ORDER — METOPROLOL TARTRATE 50 MG
1 TABLET ORAL
Qty: 0 | Refills: 0 | COMMUNITY

## 2017-10-10 RX ORDER — LIDOCAINE HCL 20 MG/ML
10 VIAL (ML) INJECTION ONCE
Qty: 0 | Refills: 0 | Status: COMPLETED | OUTPATIENT
Start: 2017-10-10 | End: 2017-10-10

## 2017-10-10 RX ORDER — SENNA PLUS 8.6 MG/1
2 TABLET ORAL
Qty: 60 | Refills: 0
Start: 2017-10-10 | End: 2017-11-09

## 2017-10-10 RX ORDER — AMIODARONE HYDROCHLORIDE 400 MG/1
1 TABLET ORAL
Qty: 30 | Refills: 0
Start: 2017-10-10 | End: 2017-11-09

## 2017-10-10 RX ORDER — DOCUSATE SODIUM 100 MG
1 CAPSULE ORAL
Qty: 60 | Refills: 0
Start: 2017-10-10 | End: 2017-11-09

## 2017-10-10 RX ORDER — ACETAMINOPHEN 500 MG
1 TABLET ORAL
Qty: 120 | Refills: 0
Start: 2017-10-10 | End: 2017-11-09

## 2017-10-10 RX ORDER — FAMOTIDINE 10 MG/ML
1 INJECTION INTRAVENOUS
Qty: 60 | Refills: 0
Start: 2017-10-10 | End: 2017-11-09

## 2017-10-10 RX ORDER — METOPROLOL TARTRATE 50 MG
1 TABLET ORAL
Qty: 60 | Refills: 0
Start: 2017-10-10 | End: 2017-11-09

## 2017-10-10 RX ADMIN — HEPARIN SODIUM 5000 UNIT(S): 5000 INJECTION INTRAVENOUS; SUBCUTANEOUS at 14:57

## 2017-10-10 RX ADMIN — AMIODARONE HYDROCHLORIDE 400 MILLIGRAM(S): 400 TABLET ORAL at 11:31

## 2017-10-10 RX ADMIN — Medication 10 MILLILITER(S): at 08:43

## 2017-10-10 RX ADMIN — Medication 100 MILLIGRAM(S): at 05:18

## 2017-10-10 RX ADMIN — FINASTERIDE 5 MILLIGRAM(S): 5 TABLET, FILM COATED ORAL at 11:31

## 2017-10-10 RX ADMIN — Medication 25 MILLIGRAM(S): at 00:40

## 2017-10-10 RX ADMIN — FAMOTIDINE 20 MILLIGRAM(S): 10 INJECTION INTRAVENOUS at 05:19

## 2017-10-10 RX ADMIN — SODIUM CHLORIDE 3 MILLILITER(S): 9 INJECTION INTRAMUSCULAR; INTRAVENOUS; SUBCUTANEOUS at 14:46

## 2017-10-10 RX ADMIN — Medication 25 MILLIGRAM(S): at 11:31

## 2017-10-10 RX ADMIN — Medication 81 MILLIGRAM(S): at 11:31

## 2017-10-10 RX ADMIN — Medication 25 MILLIGRAM(S): at 07:23

## 2017-10-10 RX ADMIN — SODIUM CHLORIDE 3 MILLILITER(S): 9 INJECTION INTRAMUSCULAR; INTRAVENOUS; SUBCUTANEOUS at 05:11

## 2017-10-10 RX ADMIN — AMIODARONE HYDROCHLORIDE 400 MILLIGRAM(S): 400 TABLET ORAL at 05:18

## 2017-10-10 NOTE — PROCEDURE NOTE - ADDITIONAL PROCEDURE DETAILS
pt s/p plavix load yesterday.  Risk of bleeding expressed.  Minimal blood loss noted. Follow up US showed no re-acculation, Re-expanded lung.  CXR pending

## 2017-10-10 NOTE — PROGRESS NOTE ADULT - ASSESSMENT
-Please follow up with Dr. Parham on 10/17/17 at 11:00am.  The office is located at [facility], [facility], 4th floor. Call us with any questions #[phone].    -Dr. Tanner would also like to follow-up with you on 10/17/17 at 1:30pm.  His contact information and office location are included in your discharge paperwork.     -Please follow-up with your primary care provider within 2 weeks of discharge.

## 2017-10-10 NOTE — PROGRESS NOTE ADULT - SUBJECTIVE AND OBJECTIVE BOX
Patient discussed on morning rounds with Dr. Parham    Operation / Date: MIDCAB on 10/5/17, PCI on 10/9/17    Surgeon: Dr. Parham    SUBJECTIVE ASSESSMENT:  71y Male seen at bedside this AM.  Feels well and ready to go home.  He does endorse some mild orthopnea that improved dramatically after thoracentesis this AM.  Otherwise, denies acute overnight events, HA, AMS, CP, palpitations, SOB at rest, [place], n/v/d, fever.     Hospital Course:  72 yo male with a history of HTN, HLD, prostate CA, CKD stage 2-3 and CAD s/p PCI to RCA (2006/2011) presented to his cardiologist with c/o severe chest discomfort and sob/dyspnea with activity. He described the left sided chest pain/pressure similar to prior to stent in 2011. In January 2017 he had unrevealing nuclear stress test. 9/20/17 cardiac cath revealed patent RCA stents, distal left main stenosis, normal LV function. Patient presented on 10/5 and underwent an uncomplicated robotic assisted MIDCAB (LIMA to LAD) with a normal EF. He arrived to the CTICU extubated on no pressor/ionotripic support. He was transferred to [facility] POD#1 and tolerated BB. CT was removed POD#1 without any complications. Patient underwent a planned cath on 10/9 and had a PCI placed with Dr. Tanner. On 10/10, AM CXR suspicious for L pleural effusion which was confirmed with bedside ultrasound.  He underwent bedside thoracentesis which drained 700cc serosanguinous fluid with repeat CXR stable.  He was medically cleared for discharge to home today with plan for outpatient follow-up with Dr. Parham and Dr. Tanner.  Medications and instructions discussed prior to discharge.     Vital Signs Last 24 Hrs  T(C): 36.2 (10 Oct 2017 10:14), Max: 37.3 (09 Oct 2017 21:00)  T(F): 97.2 (10 Oct 2017 10:14), Max: 99.1 (09 Oct 2017 21:00)  HR: 64 (10 Oct 2017 09:00) (62 - 81)  BP: 142/74 (10 Oct 2017 09:00) (116/72 - 167/98)  BP(mean): 96 (10 Oct 2017 09:00) (90 - 119)  RR: 16 (10 Oct 2017 09:00) (14 - 16)  SpO2: 98% (10 Oct 2017 09:00) (97% - 98%)  I&O's Detail    09 Oct 2017 07:01  -  10 Oct 2017 07:00  --------------------------------------------------------  IN:    IV PiggyBack: 150 mL    sodium chloride 0.9%.: 300 mL  Total IN: 450 mL    OUT:    Voided: 950 mL  Total OUT: 950 mL    Total NET: -500 mL      EPICARDIAL WIRES REMOVED: NA.  TIE DOWNS REMOVED: Yes.    PHYSICAL EXAM:    General: OOB to chair, no acute distress.     Neurological: AAOx3, no AMS or focal deficits.     Cardiovascular: RRR, S1/S2, no m/r/g.     Respiratory: No acute distress on RA.  Diminished BS at L base, no w/r/r.     Gastrointestinal: ND, NBS, non-TTP.    Extremities: Warm and well perfused, no calf ttp or edema b/l.     Vascular: Pulses 2+ in b/l UE, 1+ in b/l LE    Incision Sites: MIDCAB: C/D/I    LABS:                        12.7   7.7   )-----------( 228      ( 10 Oct 2017 12:51 )             38.6       COUMADIN: No.      PT/INR - ( 09 Oct 2017 06:28 )   PT: 12.5 sec;   INR: 1.12          PTT - ( 09 Oct 2017 06:28 )  PTT:25.8 sec    10-10    141  |  104  |  19  ----------------------------<  97  4.1   |  23  |  1.08    Ca    9.3      10 Oct 2017 06:28  Phos  2.7     10-10  Mg     2.2     10-10      MEDICATIONS  (STANDING):  I will START or STAY ON the medications listed below when I get home from the hospital:    finasteride 5 mg oral tablet  -- 1 tab(s) by mouth once a day  -- Indication: For BPH    aspirin 81 mg oral tablet  -- 1 tab(s) by mouth once a day  -- Indication: For Blood thinner    acetaminophen 325 mg oral tablet  -- 1 tab(s) by mouth every 6 hours, As Needed -mild pain MDD:4  -- Indication: For Pain    oxyCODONE-acetaminophen 5 mg-325 mg oral tablet  -- 1 tab(s) by mouth every 4 hours, As needed, Moderate Pain (4 - 6) MDD:6  -- Indication: For Severe pain    amiodarone 200 mg oral tablet  -- 1 tab(s) by mouth once a day  -- Indication: For Heart rate control    pravastatin 40 mg oral tablet  -- 1 tab(s) by mouth once a day  -- Indication: For Cholesterol    Plavix 75 mg oral tablet  -- 1 tab(s) by mouth once a day  -- Indication: For Blood thinner    metoprolol tartrate 50 mg oral tablet  -- 1 tab(s) by mouth 2 times a day   -- It is very important that you take or use this exactly as directed.  Do not skip doses or discontinue unless directed by your doctor.  May cause drowsiness.  Alcohol may intensify this effect.  Use care when operating dangerous machinery.  Some non-prescription drugs may aggravate your condition.  Read all labels carefully.  If a warning appears, check with your doctor before taking.  Take with food or milk.  This drug may impair the ability to drive or operate machinery.  Use care until you become familiar with its effects.    -- Indication: For Blood pressure    famotidine 20 mg oral tablet  -- 1 tab(s) by mouth 2 times a day  -- Indication: For Stomach health    docusate sodium 100 mg oral capsule  -- 1 cap(s) by mouth 2 times a day, As Needed -for constipation   -- Indication: For Stool softener    senna oral tablet  -- 2 tab(s) by mouth once a day (at bedtime), As Needed -for constipation   -- Indication: For Stool softener.     I will STOP taking the medications listed below when I get home from the hospital:    acebutolol 200 mg oral capsule  -- 1 cap(s) by mouth once a day    ketorolac 10 mg oral tablet  -- 1 tab(s) by mouth every 8 hours  -- It is very important that you take or use this exactly as directed.  Do not skip doses or discontinue unless directed by your doctor.  May cause drowsiness or dizziness.  Obtain medical advice before taking any non-prescription drugs as some may affect the action of this medication.  Take with food or milk.    ramipril 10 mg oral capsule  -- 1 cap(s) by mouth once a day.     I will SWITCH the dose or number of times a day I take the medications listed below when I get home from the hospital:  None.    Discharge CXR:  < from: Xray Chest 1 View AP -PORTABLE-Routine (10.09.17 @ 06:59) >  INTERPRETATION:    Portable AP Radiograph dated 10/9/2017 6:59 AM    CLINICAL INFORMATION: 71 years, Male, s/p CABG    PRIOR STUDIES: October 7, 2017    FINDINGS: Stable heart size. Increased left pleural effusion. Patchy   infiltrates in the left upper lung.    IMPRESSION:  Increased left pleural effusion. Patchy infiltrates in the left upper   lung.      < end of copied text >

## 2017-10-10 NOTE — PROCEDURE NOTE - NSPROCDETAILS_GEN_ALL_CORE
600/ultrasound assessment of effusion (localization)/location identified, draped/prepped, sterile technique used, needle inserted/introduced/ultrasound assessment of effusion (size)

## 2017-10-12 DIAGNOSIS — K21.9 GASTRO-ESOPHAGEAL REFLUX DISEASE WITHOUT ESOPHAGITIS: ICD-10-CM

## 2017-10-12 DIAGNOSIS — I25.119 ATHEROSCLEROTIC HEART DISEASE OF NATIVE CORONARY ARTERY WITH UNSPECIFIED ANGINA PECTORIS: ICD-10-CM

## 2017-10-12 DIAGNOSIS — N18.2 CHRONIC KIDNEY DISEASE, STAGE 2 (MILD): ICD-10-CM

## 2017-10-12 DIAGNOSIS — I25.84 CORONARY ATHEROSCLEROSIS DUE TO CALCIFIED CORONARY LESION: ICD-10-CM

## 2017-10-12 DIAGNOSIS — Z79.02 LONG TERM (CURRENT) USE OF ANTITHROMBOTICS/ANTIPLATELETS: ICD-10-CM

## 2017-10-12 DIAGNOSIS — R35.1 NOCTURIA: ICD-10-CM

## 2017-10-12 DIAGNOSIS — Z79.899 OTHER LONG TERM (CURRENT) DRUG THERAPY: ICD-10-CM

## 2017-10-12 DIAGNOSIS — J90 PLEURAL EFFUSION, NOT ELSEWHERE CLASSIFIED: ICD-10-CM

## 2017-10-12 DIAGNOSIS — Z87.891 PERSONAL HISTORY OF NICOTINE DEPENDENCE: ICD-10-CM

## 2017-10-12 DIAGNOSIS — N40.1 BENIGN PROSTATIC HYPERPLASIA WITH LOWER URINARY TRACT SYMPTOMS: ICD-10-CM

## 2017-10-12 DIAGNOSIS — I48.91 UNSPECIFIED ATRIAL FIBRILLATION: ICD-10-CM

## 2017-10-12 DIAGNOSIS — E86.0 DEHYDRATION: ICD-10-CM

## 2017-10-12 DIAGNOSIS — E78.5 HYPERLIPIDEMIA, UNSPECIFIED: ICD-10-CM

## 2017-10-12 DIAGNOSIS — C61 MALIGNANT NEOPLASM OF PROSTATE: ICD-10-CM

## 2017-10-12 DIAGNOSIS — Z79.82 LONG TERM (CURRENT) USE OF ASPIRIN: ICD-10-CM

## 2017-10-12 DIAGNOSIS — I25.110 ATHEROSCLEROTIC HEART DISEASE OF NATIVE CORONARY ARTERY WITH UNSTABLE ANGINA PECTORIS: ICD-10-CM

## 2017-10-12 DIAGNOSIS — I12.9 HYPERTENSIVE CHRONIC KIDNEY DISEASE WITH STAGE 1 THROUGH STAGE 4 CHRONIC KIDNEY DISEASE, OR UNSPECIFIED CHRONIC KIDNEY DISEASE: ICD-10-CM

## 2017-10-12 DIAGNOSIS — Z95.5 PRESENCE OF CORONARY ANGIOPLASTY IMPLANT AND GRAFT: ICD-10-CM

## 2017-10-16 ENCOUNTER — FORM ENCOUNTER (OUTPATIENT)
Age: 72
End: 2017-10-16

## 2017-10-17 ENCOUNTER — APPOINTMENT (OUTPATIENT)
Dept: HEART AND VASCULAR | Facility: CLINIC | Age: 72
End: 2017-10-17
Payer: MEDICARE

## 2017-10-17 ENCOUNTER — OUTPATIENT (OUTPATIENT)
Dept: OUTPATIENT SERVICES | Facility: HOSPITAL | Age: 72
LOS: 1 days | End: 2017-10-17
Payer: MEDICARE

## 2017-10-17 ENCOUNTER — APPOINTMENT (OUTPATIENT)
Dept: CARDIOTHORACIC SURGERY | Facility: CLINIC | Age: 72
End: 2017-10-17
Payer: MEDICARE

## 2017-10-17 VITALS
SYSTOLIC BLOOD PRESSURE: 140 MMHG | DIASTOLIC BLOOD PRESSURE: 65 MMHG | BODY MASS INDEX: 25.38 KG/M2 | OXYGEN SATURATION: 98 % | TEMPERATURE: 97.3 F | WEIGHT: 182 LBS | RESPIRATION RATE: 18 BRPM | HEART RATE: 69 BPM

## 2017-10-17 VITALS — OXYGEN SATURATION: 99 % | HEART RATE: 67 BPM | DIASTOLIC BLOOD PRESSURE: 78 MMHG | SYSTOLIC BLOOD PRESSURE: 125 MMHG

## 2017-10-17 DIAGNOSIS — Z95.828 PRESENCE OF OTHER VASCULAR IMPLANTS AND GRAFTS: ICD-10-CM

## 2017-10-17 PROCEDURE — 99024 POSTOP FOLLOW-UP VISIT: CPT

## 2017-10-17 PROCEDURE — 99214 OFFICE O/P EST MOD 30 MIN: CPT

## 2017-10-17 PROCEDURE — 71020: CPT | Mod: 26

## 2017-10-17 PROCEDURE — 71046 X-RAY EXAM CHEST 2 VIEWS: CPT

## 2017-10-17 RX ORDER — AMIODARONE HYDROCHLORIDE 200 MG/1
200 TABLET ORAL DAILY
Qty: 30 | Refills: 0 | Status: DISCONTINUED | COMMUNITY
End: 2017-10-17

## 2017-10-17 RX ORDER — OXYCODONE HYDROCHLORIDE AND ACETAMINOPHEN 5; 325 MG/1; MG/1
5-325 TABLET ORAL
Refills: 0 | Status: DISCONTINUED | COMMUNITY
End: 2017-10-17

## 2017-11-09 ENCOUNTER — OTHER (OUTPATIENT)
Age: 72
End: 2017-11-09

## 2017-12-19 PROCEDURE — 86900 BLOOD TYPING SEROLOGIC ABO: CPT

## 2017-12-19 PROCEDURE — 80076 HEPATIC FUNCTION PANEL: CPT

## 2017-12-19 PROCEDURE — 86850 RBC ANTIBODY SCREEN: CPT

## 2017-12-19 PROCEDURE — 83735 ASSAY OF MAGNESIUM: CPT

## 2017-12-19 PROCEDURE — 93005 ELECTROCARDIOGRAM TRACING: CPT

## 2017-12-19 PROCEDURE — 71045 X-RAY EXAM CHEST 1 VIEW: CPT

## 2017-12-19 PROCEDURE — C9248: CPT

## 2017-12-19 PROCEDURE — 80053 COMPREHEN METABOLIC PANEL: CPT

## 2017-12-19 PROCEDURE — 36415 COLL VENOUS BLD VENIPUNCTURE: CPT

## 2017-12-19 PROCEDURE — 85025 COMPLETE CBC W/AUTO DIFF WBC: CPT

## 2017-12-19 PROCEDURE — 85018 HEMOGLOBIN: CPT

## 2017-12-19 PROCEDURE — 71046 X-RAY EXAM CHEST 2 VIEWS: CPT

## 2017-12-19 PROCEDURE — 84132 ASSAY OF SERUM POTASSIUM: CPT

## 2017-12-19 PROCEDURE — 84100 ASSAY OF PHOSPHORUS: CPT

## 2017-12-19 PROCEDURE — C1725: CPT

## 2017-12-19 PROCEDURE — 80048 BASIC METABOLIC PNL TOTAL CA: CPT

## 2017-12-19 PROCEDURE — C1874: CPT

## 2017-12-19 PROCEDURE — 86923 COMPATIBILITY TEST ELECTRIC: CPT

## 2017-12-19 PROCEDURE — 85610 PROTHROMBIN TIME: CPT

## 2017-12-19 PROCEDURE — 85730 THROMBOPLASTIN TIME PARTIAL: CPT

## 2017-12-19 PROCEDURE — 83605 ASSAY OF LACTIC ACID: CPT

## 2017-12-19 PROCEDURE — P9045: CPT

## 2017-12-19 PROCEDURE — 85027 COMPLETE CBC AUTOMATED: CPT

## 2017-12-19 PROCEDURE — 82330 ASSAY OF CALCIUM: CPT

## 2017-12-19 PROCEDURE — 84295 ASSAY OF SERUM SODIUM: CPT

## 2017-12-19 PROCEDURE — 86901 BLOOD TYPING SEROLOGIC RH(D): CPT

## 2017-12-19 PROCEDURE — C1887: CPT

## 2017-12-19 PROCEDURE — S2900: CPT

## 2017-12-19 PROCEDURE — C1769: CPT

## 2017-12-19 PROCEDURE — 82803 BLOOD GASES ANY COMBINATION: CPT

## 2017-12-19 PROCEDURE — C1753: CPT

## 2017-12-20 ENCOUNTER — APPOINTMENT (OUTPATIENT)
Dept: CARDIOTHORACIC SURGERY | Facility: CLINIC | Age: 72
End: 2017-12-20
Payer: MEDICARE

## 2017-12-20 ENCOUNTER — APPOINTMENT (OUTPATIENT)
Dept: HEART AND VASCULAR | Facility: CLINIC | Age: 72
End: 2017-12-20
Payer: MEDICARE

## 2017-12-20 ENCOUNTER — APPOINTMENT (OUTPATIENT)
Dept: CARDIOTHORACIC SURGERY | Facility: CLINIC | Age: 72
End: 2017-12-20

## 2017-12-20 VITALS
TEMPERATURE: 97.5 F | SYSTOLIC BLOOD PRESSURE: 128 MMHG | BODY MASS INDEX: 26.92 KG/M2 | OXYGEN SATURATION: 97 % | HEART RATE: 63 BPM | RESPIRATION RATE: 18 BRPM | DIASTOLIC BLOOD PRESSURE: 65 MMHG | WEIGHT: 193 LBS

## 2017-12-20 VITALS — HEART RATE: 67 BPM | SYSTOLIC BLOOD PRESSURE: 121 MMHG | OXYGEN SATURATION: 97 % | DIASTOLIC BLOOD PRESSURE: 71 MMHG

## 2017-12-20 PROCEDURE — 99024 POSTOP FOLLOW-UP VISIT: CPT

## 2017-12-20 PROCEDURE — 99214 OFFICE O/P EST MOD 30 MIN: CPT

## 2017-12-20 RX ORDER — FAMOTIDINE 20 MG/1
20 TABLET, FILM COATED ORAL TWICE DAILY
Qty: 180 | Refills: 3 | Status: DISCONTINUED | COMMUNITY
End: 2017-12-20

## 2017-12-20 RX ORDER — ISOSORBIDE DINITRATE 5 MG/1
5 TABLET ORAL
Qty: 60 | Refills: 0 | Status: DISCONTINUED | COMMUNITY
Start: 2017-08-15

## 2017-12-20 RX ORDER — NITROGLYCERIN 0.4 MG/1
0.4 TABLET SUBLINGUAL
Qty: 25 | Refills: 0 | Status: DISCONTINUED | COMMUNITY
Start: 2017-08-15

## 2017-12-26 ENCOUNTER — APPOINTMENT (OUTPATIENT)
Dept: CARDIOTHORACIC SURGERY | Facility: CLINIC | Age: 72
End: 2017-12-26

## 2018-03-16 RX ORDER — METOPROLOL TARTRATE 25 MG/1
25 TABLET, FILM COATED ORAL
Qty: 60 | Refills: 0 | Status: ACTIVE | COMMUNITY

## 2018-12-20 ENCOUNTER — APPOINTMENT (OUTPATIENT)
Dept: CARDIOTHORACIC SURGERY | Facility: CLINIC | Age: 73
End: 2018-12-20

## 2019-02-10 PROBLEM — T82.897A OCCLUDED CORONARY ARTERY STENT: Status: RESOLVED | Noted: 2017-09-28 | Resolved: 2019-02-10

## 2019-10-09 ENCOUNTER — TRANSCRIPTION ENCOUNTER (OUTPATIENT)
Age: 74
End: 2019-10-09

## 2019-10-09 NOTE — H&P ADULT - ASSESSMENT
73 y/ M Ex-Smoker w/ FMHX CAD, Lives in [place], PMHX HTN, HLD, CKD stage 2-3 (Cr. 1.1-1.3 in 2017), CAD s/p mid CABG LIMA-LAD w/ Dr. Parham in 2017, s/p prior PCI (last cardiac angiogram in 10/2017 at [facility] ([facility] LM, residual ostial LCX 80%, RCA not injected (prior stents noted in RCA per previous HPI), Afib s/p Ablation in 2018 (on Eliquis last dose 10/7/19), Prostate CA (to start radiation therapy / cyber knife later this month), w/ CCS Angina Class 4 Sx     Due to pts risk factors, CCS Angina Class 4 Sx, h/o CAD as above, pt is referred for cardiac catheterization w/ possible intervention. Arrived for early hydration.  Pt reports Plavix compliance and no missed doses in last 2 weeks.   Plavix 75mg taken this AM. ASA 325mg ordered this AM  Cr. 1.1, IV NS @ 75cc/hr  Risks & benefits of procedure and alternative therapy have been explained to the patient including but not limited to: allergic reaction, bleeding w/possible need for blood transfusion, infection, renal and vascular compromise, limb damage, arrhythmia, stroke, vessel dissection/perforation, Myocardial infarction, emergent CABG. Informed consent obtained and in chart.     Patient a candidate for sedation: Yes  Sedation Type: Moderate 73 y/ M Ex-Smoker w/ FMHX CAD, Lives in [place], PMHX HTN, HLD, CKD stage 2-3 (Cr. 1.1-1.3 in 2017), CAD s/p mid CABG LIMA-LAD w/ Dr. Parham in 2017, s/p prior PCI (last cardiac angiogram in 10/2017 at [facility] ([facility]NA LM, residual ostial LCX 80%, RCA not injected (prior stents noted in RCA per previous HPI), Afib s/p Ablation in 2018 (on Eliquis last dose 10/7/19), Prostate CA (to start radiation therapy / cyber knife later this month), w/ CCS Angina Class 4 Sx     Due to pts risk factors, CCS Angina Class 4 Sx, h/o CAD as above, pt is referred for cardiac catheterization w/ possible intervention. Arrived for early hydration.  Pt reports Plavix compliance and no missed doses in last 2 weeks.   Plavix 75mg taken this AM. ASA 325mg ordered this AM  Cr. 1.1, IV NS @ 75cc/hr  As discussed with patient's [place] Cyberknife Team and NP pt can proceed with Cyberknife therapy on Eliquis and antiplatelet therapy without holding any doses.   Risks & benefits of procedure and alternative therapy have been explained to the patient including but not limited to: allergic reaction, bleeding w/possible need for blood transfusion, infection, renal and vascular compromise, limb damage, arrhythmia, stroke, vessel dissection/perforation, Myocardial infarction, emergent CABG. Informed consent obtained and in chart.     Patient a candidate for sedation: Yes  Sedation Type: Moderate 73 y/ M Ex-Smoker w/ FMHX CAD, Lives in [place], PMHX HTN, HLD, CKD stage 2-3 (Cr. 1.1-1.3 in 2017), CAD s/p mid CABG LIMA-LAD w/ Dr. Parham in 2017, s/p prior PCI (last cardiac angiogram in 10/2017 at [facility] (MARZENA LM to ostial LCX, RCA not injected (prior stents noted in RCA per previous HPI), Afib s/p Ablation in 2018 (on Eliquis last dose 10/7/19), Prostate CA (to start radiation therapy / cyber knife later this month), w/ CCS Angina Class 4 Sx     Due to pts risk factors, CCS Angina Class 4 Sx, h/o CAD as above, pt is referred for cardiac catheterization w/ possible intervention. Arrived for early hydration.  Pt reports Plavix compliance and no missed doses in last 2 weeks.   Plavix 75mg taken this AM. ASA 325mg ordered this AM  Cr. 1.1, IV NS @ 75cc/hr  As discussed with patient's [place] Cyberknife Team and NP pt can proceed with Cyberknife therapy on Eliquis and antiplatelet therapy without holding any doses.   Risks & benefits of procedure and alternative therapy have been explained to the patient including but not limited to: allergic reaction, bleeding w/possible need for blood transfusion, infection, renal and vascular compromise, limb damage, arrhythmia, stroke, vessel dissection/perforation, Myocardial infarction, emergent CABG. Informed consent obtained and in chart.     Patient a candidate for sedation: Yes  Sedation Type: Moderate

## 2019-10-09 NOTE — H&P ADULT - HISTORY OF PRESENT ILLNESS
*Confirm Medications on Arrival    73 y/ M Ex-Smoker w/ FMHX CAD, Lives in [place], PMHX HTN, HLD, CKD stage 2-3 (Cr. 1.1-1.3 in 2017), CAD s/p mid CABG LIMA-LAD w/ Dr. Parham in 2017, s/p prior PCI (last cardiac angiogram in 10/2017 at [facility] (MARZENA LM, residual ostial LCX 80%, RCA not injected (prior stents noted in RCA per previous HPI), Afib s/p Ablation in 2018 (on Eliquis last dose 10/7/19), Prostate CA (to start radiation therapy this month), who presented to his cardiologist c/o 5/10 SS non-radiating chest pressure occurring independent of rest/activity (CCS Angina Class 4 Sx). Pt reports improved symptoms after starting Isosorbide. Pt denies SOB, dizziness, diaphoresis, palpitations, LE edema, orthopnea, PND, syncope. Pt was recommended for cardiac angiogram in [place] and Interventionalist declined performing. Dr. Parham recommended cardiac angiogram in [place] with Dr. Tanner.     Due to pts risk factors, CCS Angina Class 4 Sx, h/o CAD as above, pt is referred for cardiac catheterization w/ possible intervention. *Confirm Medications on Arrival    73 y/ M Ex-Smoker w/ FMHX CAD, Lives in [place], PMHX HTN, HLD, CKD stage 2-3 (Cr. 1.1-1.3 in 2017), CAD s/p mid CABG LIMA-LAD w/ Dr. Parham in 2017, s/p prior PCI (last cardiac angiogram in 10/2017 at [facility] (MAR[facility]NA LM, residual ostial LCX 80%, RCA not injected (prior stents noted in RCA per previous HPI), Afib s/p Ablation in 2018 (on Eliquis last dose 10/7/19), Prostate CA (to start radiation therapy this month), who presented to his cardiologist c/o 5/10 SS non-radiating chest pressure occurring independent of rest/activity (CCS Angina Class 4 Sx). Pt reports improved symptoms after starting Isosorbide. Pt denies SOB, dizziness, diaphoresis, palpitations, LE edema, orthopnea, PND, syncope. Pt was recommended for cardiac angiogram in [place] and Interventionalist declined performing. Dr. Parham recommended cardiac angiogram in [place] with Dr. Tanner.     Due to pts risk factors, CCS Angina Class 4 Sx, h/o CAD as above, pt is referred for cardiac catheterization w/ possible intervention. Arrival for early hydration. 73 y/ M Ex-Smoker w/ FMHX CAD, Lives in [place], PMHX HTN, HLD, CKD stage 2-3 (Cr. 1.1-1.3 in 2017), CAD s/p mid CABG LIMA-LAD w/ Dr. Parham in 2017, s/p prior PCI (last cardiac angiogram in 10/2017 at [facility] ([facility] LM, residual ostial LCX 80%, RCA not injected (prior stents noted in RCA per previous HPI), Afib s/p Ablation in 2018 (on Eliquis last dose 10/7/19), Prostate CA (to start radiation therapy / cyber knife later this month), who presented to his cardiologist c/o 5/10 SS non-radiating chest pressure occurring independent of rest/activity (CCS Angina Class 4 Sx). Pt reports improved symptoms after starting Isosorbide. Pt denies SOB, dizziness, diaphoresis, palpitations, LE edema, orthopnea, PND, syncope. Pt was recommended for cardiac angiogram in [place] and Interventionalist declined performing. Dr. Parham recommended cardiac angiogram in [place] with Dr. Tanner.     Due to pts risk factors, CCS Angina Class 4 Sx, h/o CAD as above, pt is referred for cardiac catheterization w/ possible intervention. Arrival for early hydration. 73 y/ M Ex-Smoker w/ FMHX CAD, Lives in [place], PMHX HTN, HLD, CKD stage 2-3 (Cr. 1.1-1.3 in 2017), CAD s/p mid CABG LIMA-LAD w/ Dr. Parham in 2017, s/p prior PCI (last cardiac angiogram in 10/2017 at [facility] (MARZENA LM to ostial LCX, RCA not injected (prior stents noted in RCA per previous HPI), Afib s/p Ablation in 2018 (on Eliquis last dose 10/7/19), Prostate CA (to start radiation therapy / cyber knife later this month), who presented to his cardiologist c/o 5/10 SS non-radiating chest pressure occurring independent of rest/activity (CCS Angina Class 4 Sx). Pt reports improved symptoms after starting Isosorbide. Pt denies SOB, dizziness, diaphoresis, palpitations, LE edema, orthopnea, PND, syncope. Pt was recommended for cardiac angiogram in [place] and Interventionalist declined performing. Dr. Parham recommended cardiac angiogram in [place] with Dr. Tanner.     Due to pts risk factors, CCS Angina Class 4 Sx, h/o CAD as above, pt is referred for cardiac catheterization w/ possible intervention. Arrival for early hydration.

## 2019-10-09 NOTE — H&P ADULT - NSICDXPASTSURGICALHX_GEN_ALL_CORE_FT
PAST SURGICAL HISTORY:  Bilateral Inguinal Hernia repair 2005    Coronary Artery Stent Placement 6/26/06  [facility].  Drug eluting stent  RCA    S/P Carpal Tunnel Release right and left 2007 PAST SURGICAL HISTORY:  Bilateral cataracts     Bilateral Inguinal Hernia repair 2005    Coronary Artery Stent Placement 6/26/06  [facility].  Drug eluting stent  RCA    S/P Carpal Tunnel Release right and left 2007

## 2019-10-09 NOTE — H&P ADULT - NSHPPHYSICALEXAM_GEN_ALL_CORE
V/S 	BP:  117/68   HR: 68    RR: 16   T: 97.8	  O2: 98% RA   	  GEN: NAD  PULM:  CTA B/L  CARD: No JVD B/L, RRR, S1 and S2   ABD: +BS, NT, soft/ND	  EXT: No Edema B/L LE  NEURO: A+Ox3, no focal deficit  PULSES: 2+ Radial b/l, 2+ Femoral b/l (no bruit b/l), DP Faint to 1+ b/l, PT 2+ b/l  ASA III, Mallampati III  EKG Sinus Bradycardia @ 53bpm, J point elevation in V2, TWI III, PAC

## 2019-10-09 NOTE — H&P ADULT - NSHPLABSRESULTS_GEN_ALL_CORE
10-10    142  |  108  |  23  ----------------------------<  90  4.3   |  24  |  1.10    Ca    9.3      10 Oct 2019 08:52    TPro  6.9  /  Alb  4.1  /  TBili  0.5  /  DBili  x   /  AST  29  /  ALT  26  /  AlkPhos  48  10-10      PT/INR - ( 10 Oct 2019 08:52 )   PT: 14.0 sec;   INR: 1.23          PTT - ( 10 Oct 2019 08:52 )  PTT:30.4 sec    CARDIAC MARKERS ( 10 Oct 2019 08:52 )  x     / x     / 305 U/L / x     / x

## 2019-10-10 ENCOUNTER — INPATIENT (INPATIENT)
Facility: HOSPITAL | Age: 74
LOS: 0 days | Discharge: HOME | DRG: 247 | End: 2019-10-11
Attending: INTERNAL MEDICINE | Admitting: INTERNAL MEDICINE
Payer: MEDICARE

## 2019-10-10 VITALS
WEIGHT: 190.92 LBS | RESPIRATION RATE: 17 BRPM | OXYGEN SATURATION: 96 % | SYSTOLIC BLOOD PRESSURE: 158 MMHG | DIASTOLIC BLOOD PRESSURE: 81 MMHG | HEART RATE: 54 BPM | HEIGHT: 71 IN

## 2019-10-10 DIAGNOSIS — H26.9 UNSPECIFIED CATARACT: Chronic | ICD-10-CM

## 2019-10-10 LAB
ALBUMIN SERPL ELPH-MCNC: 4.1 G/DL — SIGNIFICANT CHANGE UP (ref 3.3–5)
ALP SERPL-CCNC: 48 U/L — SIGNIFICANT CHANGE UP (ref 40–120)
ALT FLD-CCNC: 26 U/L — SIGNIFICANT CHANGE UP (ref 10–45)
ANION GAP SERPL CALC-SCNC: 10 MMOL/L — SIGNIFICANT CHANGE UP (ref 5–17)
APTT BLD: 30.4 SEC — SIGNIFICANT CHANGE UP (ref 27.5–36.3)
AST SERPL-CCNC: 29 U/L — SIGNIFICANT CHANGE UP (ref 10–40)
BILIRUB SERPL-MCNC: 0.5 MG/DL — SIGNIFICANT CHANGE UP (ref 0.2–1.2)
BUN SERPL-MCNC: 23 MG/DL — SIGNIFICANT CHANGE UP (ref 7–23)
CALCIUM SERPL-MCNC: 9.3 MG/DL — SIGNIFICANT CHANGE UP (ref 8.4–10.5)
CHLORIDE SERPL-SCNC: 108 MMOL/L — SIGNIFICANT CHANGE UP (ref 96–108)
CHOLEST SERPL-MCNC: 135 MG/DL — SIGNIFICANT CHANGE UP (ref 10–199)
CK SERPL-CCNC: 305 U/L — HIGH (ref 30–200)
CO2 SERPL-SCNC: 24 MMOL/L — SIGNIFICANT CHANGE UP (ref 22–31)
CREAT SERPL-MCNC: 1.1 MG/DL — SIGNIFICANT CHANGE UP (ref 0.5–1.3)
CRP SERPL-MCNC: 0.09 MG/DL — SIGNIFICANT CHANGE UP (ref 0–0.4)
EXTRA LAVENDER TOP TUBE: SIGNIFICANT CHANGE UP
GLUCOSE SERPL-MCNC: 90 MG/DL — SIGNIFICANT CHANGE UP (ref 70–99)
HBA1C BLD-MCNC: 4.6 % — SIGNIFICANT CHANGE UP (ref 4–5.6)
HCT VFR BLD CALC: 40.3 % — SIGNIFICANT CHANGE UP (ref 39–50)
HDLC SERPL-MCNC: 44 MG/DL — SIGNIFICANT CHANGE UP
HGB BLD-MCNC: 13.6 G/DL — SIGNIFICANT CHANGE UP (ref 13–17)
INR BLD: 1.23 — HIGH (ref 0.88–1.16)
LIPID PNL WITH DIRECT LDL SERPL: 65 MG/DL — SIGNIFICANT CHANGE UP
MCHC RBC-ENTMCNC: 32.2 PG — SIGNIFICANT CHANGE UP (ref 27–34)
MCHC RBC-ENTMCNC: 33.7 GM/DL — SIGNIFICANT CHANGE UP (ref 32–36)
MCV RBC AUTO: 95.3 FL — SIGNIFICANT CHANGE UP (ref 80–100)
NRBC # BLD: 0 /100 WBCS — SIGNIFICANT CHANGE UP (ref 0–0)
PLATELET # BLD AUTO: 162 K/UL — SIGNIFICANT CHANGE UP (ref 150–400)
POTASSIUM SERPL-MCNC: 4.3 MMOL/L — SIGNIFICANT CHANGE UP (ref 3.5–5.3)
POTASSIUM SERPL-SCNC: 4.3 MMOL/L — SIGNIFICANT CHANGE UP (ref 3.5–5.3)
PROT SERPL-MCNC: 6.9 G/DL — SIGNIFICANT CHANGE UP (ref 6–8.3)
PROTHROM AB SERPL-ACNC: 14 SEC — HIGH (ref 10–12.9)
RBC # BLD: 4.23 M/UL — SIGNIFICANT CHANGE UP (ref 4.2–5.8)
RBC # FLD: 13.3 % — SIGNIFICANT CHANGE UP (ref 10.3–14.5)
SODIUM SERPL-SCNC: 142 MMOL/L — SIGNIFICANT CHANGE UP (ref 135–145)
TOTAL CHOLESTEROL/HDL RATIO MEASUREMENT: 3.1 RATIO — LOW (ref 3.4–9.6)
TRIGL SERPL-MCNC: 128 MG/DL — SIGNIFICANT CHANGE UP (ref 10–149)
WBC # BLD: 5.27 K/UL — SIGNIFICANT CHANGE UP (ref 3.8–10.5)
WBC # FLD AUTO: 5.27 K/UL — SIGNIFICANT CHANGE UP (ref 3.8–10.5)

## 2019-10-10 PROCEDURE — 92928 PRQ TCAT PLMT NTRAC ST 1 LES: CPT | Mod: LC

## 2019-10-10 PROCEDURE — 99221 1ST HOSP IP/OBS SF/LOW 40: CPT

## 2019-10-10 PROCEDURE — 93010 ELECTROCARDIOGRAM REPORT: CPT

## 2019-10-10 PROCEDURE — 93459 L HRT ART/GRFT ANGIO: CPT | Mod: 26,59

## 2019-10-10 RX ORDER — CLOPIDOGREL BISULFATE 75 MG/1
1 TABLET, FILM COATED ORAL
Qty: 0 | Refills: 0 | DISCHARGE

## 2019-10-10 RX ORDER — ISOSORBIDE MONONITRATE 60 MG/1
30 TABLET, EXTENDED RELEASE ORAL DAILY
Refills: 0 | Status: DISCONTINUED | OUTPATIENT
Start: 2019-10-10 | End: 2019-10-11

## 2019-10-10 RX ORDER — FINASTERIDE 5 MG/1
1 TABLET, FILM COATED ORAL
Qty: 0 | Refills: 0 | DISCHARGE

## 2019-10-10 RX ORDER — CLOPIDOGREL BISULFATE 75 MG/1
75 TABLET, FILM COATED ORAL DAILY
Refills: 0 | Status: DISCONTINUED | OUTPATIENT
Start: 2019-10-11 | End: 2019-10-11

## 2019-10-10 RX ORDER — FINASTERIDE 5 MG/1
5 TABLET, FILM COATED ORAL EVERY 24 HOURS
Refills: 0 | Status: DISCONTINUED | OUTPATIENT
Start: 2019-10-10 | End: 2019-10-11

## 2019-10-10 RX ORDER — ATORVASTATIN CALCIUM 80 MG/1
40 TABLET, FILM COATED ORAL AT BEDTIME
Refills: 0 | Status: DISCONTINUED | OUTPATIENT
Start: 2019-10-10 | End: 2019-10-11

## 2019-10-10 RX ORDER — METOPROLOL TARTRATE 50 MG
25 TABLET ORAL
Refills: 0 | Status: DISCONTINUED | OUTPATIENT
Start: 2019-10-10 | End: 2019-10-11

## 2019-10-10 RX ORDER — INFLUENZA VIRUS VACCINE 15; 15; 15; 15 UG/.5ML; UG/.5ML; UG/.5ML; UG/.5ML
0.5 SUSPENSION INTRAMUSCULAR ONCE
Refills: 0 | Status: DISCONTINUED | OUTPATIENT
Start: 2019-10-10 | End: 2019-10-11

## 2019-10-10 RX ORDER — ISOSORBIDE MONONITRATE 60 MG/1
1 TABLET, EXTENDED RELEASE ORAL
Qty: 0 | Refills: 0 | DISCHARGE

## 2019-10-10 RX ORDER — APIXABAN 2.5 MG/1
1 TABLET, FILM COATED ORAL
Qty: 0 | Refills: 0 | DISCHARGE

## 2019-10-10 RX ORDER — SODIUM CHLORIDE 9 MG/ML
500 INJECTION INTRAMUSCULAR; INTRAVENOUS; SUBCUTANEOUS
Refills: 0 | Status: DISCONTINUED | OUTPATIENT
Start: 2019-10-10 | End: 2019-10-10

## 2019-10-10 RX ORDER — SODIUM CHLORIDE 9 MG/ML
500 INJECTION INTRAMUSCULAR; INTRAVENOUS; SUBCUTANEOUS
Refills: 0 | Status: DISCONTINUED | OUTPATIENT
Start: 2019-10-10 | End: 2019-10-11

## 2019-10-10 RX ORDER — PANTOPRAZOLE SODIUM 20 MG/1
40 TABLET, DELAYED RELEASE ORAL
Refills: 0 | Status: DISCONTINUED | OUTPATIENT
Start: 2019-10-10 | End: 2019-10-11

## 2019-10-10 RX ORDER — APIXABAN 2.5 MG/1
2.5 TABLET, FILM COATED ORAL
Refills: 0 | Status: DISCONTINUED | OUTPATIENT
Start: 2019-10-10 | End: 2019-10-11

## 2019-10-10 RX ORDER — METOPROLOL TARTRATE 50 MG
1 TABLET ORAL
Qty: 0 | Refills: 0 | DISCHARGE

## 2019-10-10 RX ORDER — ASPIRIN/CALCIUM CARB/MAGNESIUM 324 MG
325 TABLET ORAL ONCE
Refills: 0 | Status: COMPLETED | OUTPATIENT
Start: 2019-10-10 | End: 2019-10-10

## 2019-10-10 RX ORDER — METOPROLOL TARTRATE 50 MG
50 TABLET ORAL
Refills: 0 | Status: DISCONTINUED | OUTPATIENT
Start: 2019-10-10 | End: 2019-10-11

## 2019-10-10 RX ORDER — ASPIRIN/CALCIUM CARB/MAGNESIUM 324 MG
81 TABLET ORAL DAILY
Refills: 0 | Status: DISCONTINUED | OUTPATIENT
Start: 2019-10-11 | End: 2019-10-11

## 2019-10-10 RX ADMIN — ATORVASTATIN CALCIUM 40 MILLIGRAM(S): 80 TABLET, FILM COATED ORAL at 21:04

## 2019-10-10 RX ADMIN — SODIUM CHLORIDE 75 MILLILITER(S): 9 INJECTION INTRAMUSCULAR; INTRAVENOUS; SUBCUTANEOUS at 09:28

## 2019-10-10 RX ADMIN — Medication 325 MILLIGRAM(S): at 10:34

## 2019-10-10 RX ADMIN — Medication 50 MILLIGRAM(S): at 21:04

## 2019-10-10 RX ADMIN — SODIUM CHLORIDE 75 MILLILITER(S): 9 INJECTION INTRAMUSCULAR; INTRAVENOUS; SUBCUTANEOUS at 15:30

## 2019-10-10 NOTE — PATIENT PROFILE ADULT - VISION (WITH CORRECTIVE LENSES IF THE PATIENT USUALLY WEARS THEM):
glasses worn daily/Partially impaired: cannot see medication labels or newsprint, but can see obstacles in path, and the surrounding layout; can count fingers at arm's length

## 2019-10-10 NOTE — CHART NOTE - NSCHARTNOTEFT_GEN_A_CORE
Patient reported left sided non radiating chest pain post procedure (unable to quantify but states its mild). Patient s/p MARZENA LM and PTCA pLCx ISR. Patient denies SOB, palpitations, dizziness, diaphoresis, N/V. VSS. 's/70's. P 50's O2 sat 99% RA. EKG performed and essentially unchanged from prior (mild new TWI AVF- however RCA not intervened on). Dr. Tanner and 5 [facility] PAs made aware. Chest pain is improving so will not administer NTG or Morphine at this time. Continue to monitor.

## 2019-10-10 NOTE — CONSULT NOTE ADULT - SUBJECTIVE AND OBJECTIVE BOX
Preventive Cardiology Consultation Note    Cardiologist - Dr. Parham     CHIEF COMPLAINT: s/p cardiac catheterization requiring cardiovascular prevention optimization and education    HISTORY OF PRESENT ILLNESS: 73 y/ M Ex-Smoker w/ FMHX CAD, Lives in [place], PMHX HTN, HLD, CKD stage 2-3 (Cr. 1.1-1.3 in 2017), CAD s/p mid CABG LIMA-LAD w/ Dr. Parham in 2017, s/p prior PCI (last cardiac angiogram in 10/2017 at [facility] (MARZENA LM to ostial LCX, RCA not injected (prior stents noted in RCA per previous HPI), Afib s/p Ablation in 2018 (on Eliquis last dose 10/7/19), Prostate CA (to start radiation therapy / cyber knife later this month). Patient is now s/p cardiac cath 10/10/19: MARZENA LM 75% in segment . Ostial LAD 95%. Patent LIMA-LAD. PTCA pLCx 75% ISR. Patent MARZENA in mRCA.    Review of systems otherwise negative.     Lifestyle History:  Mediterranean Diet Score (9 question survey) was 4.   (8-9: optimal, 6-7: near-optimal, 4-5: suboptimal, 0-3: markedly suboptimal)  Exercise: Patient denies any regular exercise other than walking necessary for daily activities.   Smoking: Former smoker (1 PPD x 20 years; quit 30 years ago).   Stress: Patient denies any stress.     PAST MEDICAL & SURGICAL HISTORY:  Prostate cancer  HLD (hyperlipidemia)  GERD (gastroesophageal reflux disease)  CKD (chronic kidney disease) stage 2, GFR 60-89 ml/min  Stented coronary artery  CAD (Coronary Artery Disease)  HTN (Hypertension)  Carpal Tunnel Syndrome: right and left  Bilateral cataracts  S/P Carpal Tunnel Release: right and left 2007  Bilateral Inguinal Hernia: repair 2005  Coronary Artery Stent Placement: 6/26/06  [facility].  Drug eluting stent  RCA    FAMILY HISTORY:   CAD - myocardial infarction, Father - MI age 40s.    Allergies:   No Known Allergies      HOME MEDICATIONS:   Eliquis 2.5 mg oral tablet: 1 tab(s) orally 2 times a day (10 Oct 2019 09:28)  finasteride 5 mg oral tablet: 1 tab(s) orally once a day (10 Oct 2019 09:28)  Imdur 30 mg oral tablet, extended release: 1 tab(s) orally once a day (in the morning) (10 Oct 2019 09:28)  Metoprolol Tartrate 25 mg oral tablet: 1 tab(s) orally once a day in morning  (10 Oct 2019 09:28)  Metoprolol Tartrate 50 mg oral tablet: 1 tab(s) orally once a day in evening (10 Oct 2019 09:28)  Plavix 75 mg oral tablet: 1 tab(s) orally once a day (10 Oct 2019 09:28)  pravastatin 40 mg oral tablet: 1 tab(s) orally once a day (10 Oct 2019 09:28)      PHYSICAL EXAM:  BP:  117/68   HR: 68    RR: 16   T: 97.8	  O2: 98% RA     Gen- awake, conversive  Head-NCAT; eyes: no corneal arcus noted b/l; no xanthelasmas   Neck- no thyromegaly, no cervical LAD, no JVD, no carotid bruit b/l  Respiratory- good air entry b/l, no WRR  Cardiovascular- S1S2, RRR, no MRG appr, no LE edema b/l, distal pulses 2+ b/l  Gastrointestinal- no HSM, no masses  Neurology- moves all extremities, no focal deficits  Psych- normal affect, non-depressed mood  Skin- no lesions, no rashes, no xanthomas     LABS:	                        13.6   5.27  )-----------( 162      ( 10 Oct 2019 08:52 )             40.3     10-10    142  |  108  |  23  ----------------------------<  90  4.3   |  24  |  1.10    Ca    9.3      10 Oct 2019 08:52    TPro  6.9  /  Alb  4.1  /  TBili  0.5  /  DBili  x   /  AST  29  /  ALT  26  /  AlkPhos  48  10-10      Hemoglobin A1C, Whole Blood: 4.6 % (10-10 @ 08:52)      Cholesterol, Serum: 135 mg/dL (10-10 @ 08:52)  HDL Cholesterol, Serum: 44 mg/dL (10-10 @ 08:52)  Triglycerides, Serum: 128 mg/dL (10-10 @ 08:52)  Direct LDL: 65 mg/dL (10-10 @ 08:52)    C-Reactive Protein, Serum: 0.09 mg/dL (10-10 @ 08:52)      ASSESSMENT/RECOMMENDATIONS: 	  Patient's dietary, exercise and overall lifestyle habits were reviewed. The concept of atherosclerosis and its systemic nature was discussed with a focus on the need to get all cardiovascular risk factors to goal. At this time, I would like to make the following recommendations to optimize atherosclerotic risk factors.     RECOMMENDATIONS:   Anti-platelet Therapy: DAPT per interventionalist recommendation.   Lipid Therapy: Patient is currently taking pravastatin 40mg daily and is compliant and tolerating it well.  In general, we recommend the use of atorvastatin or rosuvastatin, if tolerated. We would recommend switching the patient to either of those agents if possible for better lipid control. If that is not feasible, it would be reasonable to maintain his current regimen at this time, or consider increasing the statin dosage, as his LDL-C is at goal and lifestyle modifications will likely benefit him further.  Hypertension: Blood pressures during this stay were well-controlled.   Mediterranean Diet Score is 4. Some suggestions include continue incorporating 2 or more servings per day of vegetables, fruits, and whole grains. Increase intake of fish and legumes/beans to 2 or more servings per week. Aim to increase intake of healthy fats, such as olive oil and avocados, and have a handful of nuts/seeds most days. Reduce red/processed meat consumption to 2 or fewer times per week.   Exercise: Recommended gradually increasing activity to 30-45 minutes most days of the week once cleared by referring cardiologist. Cardiac rehab might benefit this patient and is covered by major insurance plans (other than co-pays), please refer.   Medication Adherence: Patient has no issues with adherence at this time.   Smoking: This patient is a non-smoker.   Obesity/Overweight: The patient was advised about specific mechanisms such as reduced portions and increased activity for efforts toward weight loss.   Glucometabolic State: Patient's blood sugar is well-controlled at this time. HbA1c today was 4.6%.   Sleep Apnea: The patient is at low risk for sleep apnea.   Psychological Stress: The patient appears to be coping with stressors well at this time.     Thank you for the opportunity to see this patient. Please feel free to contact Prevention if there are any questions, or if you feel that your patient would benefit from continued follow-up visits with the Program.    I am acting as a scribe on behalf of Dr. Tara Kearns,    Rachel Nagy, [facility]  Cardiovascular Prevention     Tara Kearns MD  System Director, Cardiovascular Prevention

## 2019-10-11 ENCOUNTER — TRANSCRIPTION ENCOUNTER (OUTPATIENT)
Age: 74
End: 2019-10-11

## 2019-10-11 VITALS
OXYGEN SATURATION: 96 % | SYSTOLIC BLOOD PRESSURE: 152 MMHG | RESPIRATION RATE: 18 BRPM | HEART RATE: 53 BPM | DIASTOLIC BLOOD PRESSURE: 70 MMHG

## 2019-10-11 LAB
ANION GAP SERPL CALC-SCNC: 8 MMOL/L — SIGNIFICANT CHANGE UP (ref 5–17)
BASOPHILS # BLD AUTO: 0.02 K/UL — SIGNIFICANT CHANGE UP (ref 0–0.2)
BASOPHILS NFR BLD AUTO: 0.3 % — SIGNIFICANT CHANGE UP (ref 0–2)
BUN SERPL-MCNC: 15 MG/DL — SIGNIFICANT CHANGE UP (ref 7–23)
CALCIUM SERPL-MCNC: 9.2 MG/DL — SIGNIFICANT CHANGE UP (ref 8.4–10.5)
CHLORIDE SERPL-SCNC: 108 MMOL/L — SIGNIFICANT CHANGE UP (ref 96–108)
CO2 SERPL-SCNC: 27 MMOL/L — SIGNIFICANT CHANGE UP (ref 22–31)
CREAT SERPL-MCNC: 1.09 MG/DL — SIGNIFICANT CHANGE UP (ref 0.5–1.3)
EOSINOPHIL # BLD AUTO: 0.12 K/UL — SIGNIFICANT CHANGE UP (ref 0–0.5)
EOSINOPHIL NFR BLD AUTO: 1.6 % — SIGNIFICANT CHANGE UP (ref 0–6)
GLUCOSE SERPL-MCNC: 94 MG/DL — SIGNIFICANT CHANGE UP (ref 70–99)
HCT VFR BLD CALC: 41.9 % — SIGNIFICANT CHANGE UP (ref 39–50)
HCV AB S/CO SERPL IA: 0.23 S/CO — SIGNIFICANT CHANGE UP
HCV AB SERPL-IMP: SIGNIFICANT CHANGE UP
HGB BLD-MCNC: 13.8 G/DL — SIGNIFICANT CHANGE UP (ref 13–17)
IMM GRANULOCYTES NFR BLD AUTO: 0.3 % — SIGNIFICANT CHANGE UP (ref 0–1.5)
LYMPHOCYTES # BLD AUTO: 1.8 K/UL — SIGNIFICANT CHANGE UP (ref 1–3.3)
LYMPHOCYTES # BLD AUTO: 23.5 % — SIGNIFICANT CHANGE UP (ref 13–44)
MAGNESIUM SERPL-MCNC: 2 MG/DL — SIGNIFICANT CHANGE UP (ref 1.6–2.6)
MCHC RBC-ENTMCNC: 31.7 PG — SIGNIFICANT CHANGE UP (ref 27–34)
MCHC RBC-ENTMCNC: 32.9 GM/DL — SIGNIFICANT CHANGE UP (ref 32–36)
MCV RBC AUTO: 96.3 FL — SIGNIFICANT CHANGE UP (ref 80–100)
MONOCYTES # BLD AUTO: 0.72 K/UL — SIGNIFICANT CHANGE UP (ref 0–0.9)
MONOCYTES NFR BLD AUTO: 9.4 % — SIGNIFICANT CHANGE UP (ref 2–14)
NEUTROPHILS # BLD AUTO: 4.97 K/UL — SIGNIFICANT CHANGE UP (ref 1.8–7.4)
NEUTROPHILS NFR BLD AUTO: 64.9 % — SIGNIFICANT CHANGE UP (ref 43–77)
NRBC # BLD: 0 /100 WBCS — SIGNIFICANT CHANGE UP (ref 0–0)
PLATELET # BLD AUTO: 149 K/UL — LOW (ref 150–400)
POTASSIUM SERPL-MCNC: 4 MMOL/L — SIGNIFICANT CHANGE UP (ref 3.5–5.3)
POTASSIUM SERPL-SCNC: 4 MMOL/L — SIGNIFICANT CHANGE UP (ref 3.5–5.3)
RBC # BLD: 4.35 M/UL — SIGNIFICANT CHANGE UP (ref 4.2–5.8)
RBC # FLD: 13.1 % — SIGNIFICANT CHANGE UP (ref 10.3–14.5)
SODIUM SERPL-SCNC: 143 MMOL/L — SIGNIFICANT CHANGE UP (ref 135–145)
WBC # BLD: 7.65 K/UL — SIGNIFICANT CHANGE UP (ref 3.8–10.5)
WBC # FLD AUTO: 7.65 K/UL — SIGNIFICANT CHANGE UP (ref 3.8–10.5)

## 2019-10-11 PROCEDURE — 99239 HOSP IP/OBS DSCHRG MGMT >30: CPT

## 2019-10-11 RX ADMIN — PANTOPRAZOLE SODIUM 40 MILLIGRAM(S): 20 TABLET, DELAYED RELEASE ORAL at 05:49

## 2019-10-11 RX ADMIN — APIXABAN 2.5 MILLIGRAM(S): 2.5 TABLET, FILM COATED ORAL at 11:46

## 2019-10-11 RX ADMIN — CLOPIDOGREL BISULFATE 75 MILLIGRAM(S): 75 TABLET, FILM COATED ORAL at 11:46

## 2019-10-11 RX ADMIN — FINASTERIDE 5 MILLIGRAM(S): 5 TABLET, FILM COATED ORAL at 05:50

## 2019-10-11 RX ADMIN — Medication 25 MILLIGRAM(S): at 05:50

## 2019-10-11 NOTE — DISCHARGE NOTE PROVIDER - HOSPITAL COURSE
73 y/ M Ex-Smoker w/ FMHX CAD, Lives in [place], PMHX HTN, HLD, CKD stage 2-3 (Cr. 1.1-1.3 in 2017), CAD s/p mid CABG LIMA-LAD w/ Dr. Parham in 2017, s/p prior PCI (last cardiac angiogram in 10/2017 at [facility] (MARZENA LM to ostial LCX, RCA not injected (prior stents noted in RCA per previous HPI), Afib s/p Ablation in 2018 (on Eliquis last dose 10/7/19), Prostate CA (to start radiation therapy / cyber knife later this month, confirmed w/ [facility] team that patient can continue w/ antiplatlet therapy during treatment), who presented to his cardiologist with c/o CCS Angina Class 4 Sx, h/o CAD as above, pt is referred for cardiac catheterization w/ possible intervention.  Patient is s/p cardiac cath 10/10 receiving a MARZENA to LM 75% in segment  w/ residual Ostial LAD 95%, Patent LIMA-LAD. PTCA pLCx 75% ISR. Patent MARZENA in mRCA.  Right radial band removed and access site remained stable with no bleeding, hematoma, and pulses intact.  Patient w/ some chest pain post procedure w/ repeat EKG revealing new TWI in AVF, otherwise no other changes.  Dr. Tanner aware.  Chest pain self resolved.  Patient doing well overnight.  Patient taking Eliquis 2.5mg PO BID, confirmed w/ patient the lower dose is secondary to persistent nose bleeding and was told by his outpatient cardiologist to continue the low dose eliquis.  Patient will continue on Eliquis and Plavix, no ASA for stent placement as per Dr. Tanner.   Labs, vitals, meds reviewed with Dr. Aparicio and patient deemed stable for discharge home.  Patient states he has refills of all his medications at home.  He will follow up with his cardiologist in [place].

## 2019-10-11 NOTE — DISCHARGE NOTE NURSING/CASE MANAGEMENT/SOCIAL WORK - PATIENT PORTAL LINK FT
You can access the FollowMyHealth Patient Portal offered by [facility] by registering at the following website: [email or web address]. By joining [employer]’s FollowMyHealth portal, you will also be able to view your health information using other applications (apps) compatible with our system.

## 2019-10-11 NOTE — DISCHARGE NOTE PROVIDER - NSDCCPCAREPLAN_GEN_ALL_CORE_FT
PRINCIPAL DISCHARGE DIAGNOSIS  Diagnosis: Coronary artery disease  Assessment and Plan of Treatment: - Please follow up with your cardiologist in [place] in 1-2 weeks  - YOu underwent a cardiac catheterization and received a drug eluding stent to your Left Main artery.   - Please continue to take Plavix 75mg daily.  Do not take Aspirin as you are on eliquis.  DO NOT STOP YOUR PLAVIX AS IT PREVENTS YOUR STENTS FROM    You underwent a coronary angiogram and should wait 3 days before returning to ordinary activities. Do not drive for 2 days. Consult your doctor before returning to vigorous activity. You may return to work in 3-5 days. The catheter from your wrist was removed and you should remove the dressing in 24 hours. You may shower once the dressing is removed, but avoid baths, hot tubs, or swimming for 5 days to prevent infection. If you notice bleeding from the site, hardening and pain at the site, drainage or redness from the site, coolness/paleness of the extremity, swelling, or fever, please call [phone].      SECONDARY DISCHARGE DIAGNOSES  Diagnosis: Chronic kidney disease (CKD)  Assessment and Plan of Treatment: YOur kidney function remained stable.  Please follow up with your cardiologist or primary doctor to repeat blood work after the cardiac cath procedure.    Diagnosis: Atrial fibrillation  Assessment and Plan of Treatment: Continue Eliquis 2.5mg Po BID.  Discuss with your cardiologist about increasing the dose to 5mg Twice daily if you can tolerate it.    Diagnosis: High blood cholesterol  Assessment and Plan of Treatment: Your LDL level is controlled at 65.  Continue your cholesterol medications    Diagnosis: High blood pressure  Assessment and Plan of Treatment: Continue blood pressure medication as usual.

## 2019-10-18 DIAGNOSIS — I48.91 UNSPECIFIED ATRIAL FIBRILLATION: ICD-10-CM

## 2019-10-18 DIAGNOSIS — Z98.42 CATARACT EXTRACTION STATUS, LEFT EYE: ICD-10-CM

## 2019-10-18 DIAGNOSIS — R07.9 CHEST PAIN, UNSPECIFIED: ICD-10-CM

## 2019-10-18 DIAGNOSIS — I25.110 ATHEROSCLEROTIC HEART DISEASE OF NATIVE CORONARY ARTERY WITH UNSTABLE ANGINA PECTORIS: ICD-10-CM

## 2019-10-18 DIAGNOSIS — E66.9 OBESITY, UNSPECIFIED: ICD-10-CM

## 2019-10-18 DIAGNOSIS — Z79.01 LONG TERM (CURRENT) USE OF ANTICOAGULANTS: ICD-10-CM

## 2019-10-18 DIAGNOSIS — Z79.02 LONG TERM (CURRENT) USE OF ANTITHROMBOTICS/ANTIPLATELETS: ICD-10-CM

## 2019-10-18 DIAGNOSIS — I12.9 HYPERTENSIVE CHRONIC KIDNEY DISEASE WITH STAGE 1 THROUGH STAGE 4 CHRONIC KIDNEY DISEASE, OR UNSPECIFIED CHRONIC KIDNEY DISEASE: ICD-10-CM

## 2019-10-18 DIAGNOSIS — E78.5 HYPERLIPIDEMIA, UNSPECIFIED: ICD-10-CM

## 2019-10-18 DIAGNOSIS — N18.3 CHRONIC KIDNEY DISEASE, STAGE 3 (MODERATE): ICD-10-CM
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
## 2019-10-18 DIAGNOSIS — C61 MALIGNANT NEOPLASM OF PROSTATE: ICD-10-CM

## 2019-10-18 DIAGNOSIS — Z95.1 PRESENCE OF AORTOCORONARY BYPASS GRAFT: ICD-10-CM

## 2019-10-18 DIAGNOSIS — Z87.891 PERSONAL HISTORY OF NICOTINE DEPENDENCE: ICD-10-CM

## 2019-10-18 DIAGNOSIS — Z95.5 PRESENCE OF CORONARY ANGIOPLASTY IMPLANT AND GRAFT: ICD-10-CM

## 2019-10-18 DIAGNOSIS — T82.855A STENOSIS OF CORONARY ARTERY STENT, INITIAL ENCOUNTER: ICD-10-CM
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
## 2019-10-18 DIAGNOSIS — Z98.41 CATARACT EXTRACTION STATUS, RIGHT EYE: ICD-10-CM

## 2019-10-18 DIAGNOSIS — Z82.49 FAMILY HISTORY OF ISCHEMIC HEART DISEASE AND OTHER DISEASES OF THE CIRCULATORY SYSTEM: ICD-10-CM

## 2019-11-15 PROCEDURE — 85610 PROTHROMBIN TIME: CPT

## 2019-11-15 PROCEDURE — C1753: CPT

## 2019-11-15 PROCEDURE — C1725: CPT

## 2019-11-15 PROCEDURE — 85027 COMPLETE CBC AUTOMATED: CPT

## 2019-11-15 PROCEDURE — 83036 HEMOGLOBIN GLYCOSYLATED A1C: CPT

## 2019-11-15 PROCEDURE — 93005 ELECTROCARDIOGRAM TRACING: CPT

## 2019-11-15 PROCEDURE — 80053 COMPREHEN METABOLIC PANEL: CPT

## 2019-11-15 PROCEDURE — 82550 ASSAY OF CK (CPK): CPT

## 2019-11-15 PROCEDURE — 86140 C-REACTIVE PROTEIN: CPT

## 2019-11-15 PROCEDURE — 86803 HEPATITIS C AB TEST: CPT

## 2019-11-15 PROCEDURE — 36415 COLL VENOUS BLD VENIPUNCTURE: CPT

## 2019-11-15 PROCEDURE — C1887: CPT

## 2019-11-15 PROCEDURE — C1894: CPT

## 2019-11-15 PROCEDURE — 80048 BASIC METABOLIC PNL TOTAL CA: CPT

## 2019-11-15 PROCEDURE — 80061 LIPID PANEL: CPT

## 2019-11-15 PROCEDURE — C1874: CPT

## 2019-11-15 PROCEDURE — 85025 COMPLETE CBC W/AUTO DIFF WBC: CPT

## 2019-11-15 PROCEDURE — 83735 ASSAY OF MAGNESIUM: CPT

## 2019-11-15 PROCEDURE — 85730 THROMBOPLASTIN TIME PARTIAL: CPT

## 2019-11-15 PROCEDURE — C1769: CPT

## 2020-02-28 ENCOUNTER — APPOINTMENT (OUTPATIENT)
Dept: CARDIOTHORACIC SURGERY | Facility: CLINIC | Age: 75
End: 2020-02-28
Payer: COMMERCIAL

## 2020-02-28 ENCOUNTER — NON-APPOINTMENT (OUTPATIENT)
Age: 75
End: 2020-02-28

## 2020-02-28 ENCOUNTER — APPOINTMENT (OUTPATIENT)
Dept: HEART AND VASCULAR | Facility: CLINIC | Age: 75
End: 2020-02-28
Payer: COMMERCIAL

## 2020-02-28 VITALS
TEMPERATURE: 97 F | DIASTOLIC BLOOD PRESSURE: 80 MMHG | SYSTOLIC BLOOD PRESSURE: 150 MMHG | HEART RATE: 60 BPM | BODY MASS INDEX: 26.5 KG/M2 | RESPIRATION RATE: 18 BRPM | WEIGHT: 190 LBS | OXYGEN SATURATION: 99 %

## 2020-02-28 VITALS — HEART RATE: 59 BPM | OXYGEN SATURATION: 98 % | DIASTOLIC BLOOD PRESSURE: 74 MMHG | SYSTOLIC BLOOD PRESSURE: 120 MMHG

## 2020-02-28 PROCEDURE — 99214 OFFICE O/P EST MOD 30 MIN: CPT

## 2020-02-28 PROCEDURE — 99213 OFFICE O/P EST LOW 20 MIN: CPT

## 2020-02-28 NOTE — PHYSICAL EXAM
[General Appearance - Well Developed] : well developed [Well Groomed] : well groomed [Normal Appearance] : normal appearance [No Deformities] : no deformities [General Appearance - In No Acute Distress] : no acute distress [General Appearance - Well Nourished] : well nourished [Eyelids - No Xanthelasma] : the eyelids demonstrated no xanthelasmas [Normal Oral Mucosa] : normal oral mucosa [Normal Conjunctiva] : the conjunctiva exhibited no abnormalities [No Oral Pallor] : no oral pallor [No Oral Cyanosis] : no oral cyanosis [Normal Jugular Venous V Waves Present] : normal jugular venous V waves present [Normal Jugular Venous A Waves Present] : normal jugular venous A waves present [No Jugular Venous Rodriguez A Waves] : no jugular venous rodriguez A waves [Heart Sounds] : normal S1 and S2 [Heart Rate And Rhythm] : heart rate and rhythm were normal [Murmurs] : no murmurs present [Respiration, Rhythm And Depth] : normal respiratory rhythm and effort [Abdomen Soft] : soft [Auscultation Breath Sounds / Voice Sounds] : lungs were clear to auscultation bilaterally [Exaggerated Use Of Accessory Muscles For Inspiration] : no accessory muscle use [Abdomen Tenderness] : non-tender [Abdomen Mass (___ Cm)] : no abdominal mass palpated [Nail Clubbing] : no clubbing of the fingernails [Abnormal Walk] : normal gait [Gait - Sufficient For Exercise Testing] : the gait was sufficient for exercise testing [Petechial Hemorrhages (___cm)] : no petechial hemorrhages [Cyanosis, Localized] : no localized cyanosis [Skin Color & Pigmentation] : normal skin color and pigmentation [No Venous Stasis] : no venous stasis [] : no rash [No Skin Ulcers] : no skin ulcer [Skin Lesions] : no skin lesions [Affect] : the affect was normal [No Xanthoma] : no  xanthoma was observed [Oriented To Time, Place, And Person] : oriented to person, place, and time [Mood] : the mood was normal [No Anxiety] : not feeling anxious

## 2020-03-02 NOTE — HISTORY OF PRESENT ILLNESS
[FreeTextEntry1] : 75 yo male status post Robotic assisted MIDCAB x 1 (LIMA->LAD) on 10/5/17 and S/p MARZENA->LM 10/9/17 for completion of hybrid procedure presents for 2 year follow up visit. In October 2019 he presented to his cardiologist in [place] with c/o exertional angina. He returned to [facility] and had cardiac cath by Dr. BERTRAM Tanner on 10/10/19 that revealed 75% ISR in prox LCX, LIMA->LAD widely patent, EF 55%, s/p successful PTCA and MARZENA ([facility]) ->prox LCX.  patient sees Dr. Tanner regularly,  he felt much improved since his MARZENA,  patient reports pressure like sensation different than prior pressure prior to stent, he saw Dr. Parham today who ordered CT chest for him.  \par  \par  \par

## 2020-03-02 NOTE — ASSESSMENT
[FreeTextEntry1] : 75 yo male status post Robotic assisted MIDCAB x 1 (LIMA->LAD) on 10/5/17 and S/p MARZENA->LM 10/9/17 for completion of hybrid procedure presents for 2 year follow up visit. In October 2019 he presented to his cardiologist in [place] with c/o exertional angina. He returned to [facility] and had cardiac cath by Dr. BERTRAM Tanner on 10/10/19 that revealed 75% ISR in prox LCX, LIMA->LAD widely patent, EF 55%, s/p successful PTCA and MARZENA ([place]) ->prox LCX.  patient sees Dr. Tanner regularly,  he felt much improved since his MARZENA,  patient reports pressure like sensation different than prior pressure prior to stent, he saw Dr. Parham today who ordered CT chest for him.  \par \par CAD continue with Eliquis and plavix as written, he will follow up with his cardiologist in [place], continue BB and pravastatin, ( he was unable to tolerate atorvastatin), repeat labs upon follow up\par \par \par \par \par \par

## 2023-01-09 NOTE — H&P ADULT - ASSESSMENT
71 year old male with a history of HTN, HLD, CAD s/p PCI's to RCA presents with class 3 angina, positive stress test and severe 2 vessel CAD. Dr. Parham reviewed the cardiac cath imaging and reports with the patient and his wife and discussed the case with Dr. BERTRAM Tanner. Dr. Parham discussed the risks, benefits and alternatives to surgery. Risks include but not limited to death, heart attack, bleeding, stroke, kidney problems and infection. He quoted a 1% operative mortality and complication risk. He also discussed the various approaches, minimally invasive versus sternotomy. Dr. Parham feels the patient will benefit and is a candidate for a robotic assisted MIDCAB (LIMA->LAD). All questions were addressed and patient agrees to proceed with surgery on October 5, 2017.     Plan:   PST   SDA 10/5  Pt instructed to take metoprolol morning of surgery  Instructions provided re antibacterial showers and pt given 3 sponges  Last dose of plavix take on 9/28  Plan for PCI on Monday 10/9 by Dr. BERTRAM Tanner  . . [id number] Odomzo Counseling- I discussed with the patient the risks of Odomzo including but not limited to nausea, vomiting, diarrhea, constipation, weight loss, changes in the sense of taste, decreased appetite, muscle spasms, and hair loss.  The patient verbalized understanding of the proper use and possible adverse effects of Odomzo.  All of the patient's questions and concerns were addressed.

## 2024-04-08 NOTE — DISCHARGE NOTE ADULT - NSCORESITESY/N_GEN_A_CORE_RD
Clinic Care Coordination Contact  [facility]/Voicemail    Clinical Data: Care Coordinator Outreach    Outreach Documentation Number of Outreach Attempt   4/5/2024  11:13 AM 1   4/8/2024   2:34 PM 2       Left message on patient's voicemail with call back information and requested return call.    Plan: Care Coordinator will send care coordination introduction letter with care coordinator contact information and explanation of care coordination services via [employer]. Care Coordinator will do no further outreaches at this time.    THADDEUS Noriega  [phone]  [facility] Care Resource [facility]       No

## 2024-10-09 NOTE — PATIENT PROFILE ADULT - PACKS PER DAY
Subjective   Chief Complaint   Patient presents with    vulvar atropgy     New patient here for evaluation of vulvar atrophy. Complains of burning and redness on the outside for 1-2 months.     Lara Muñiz is a 68 y.o. year old .  No LMP recorded (lmp unknown). Patient is postmenopausal.  She presents to be seen because of external pruritus and irritation x 1.5 months..     OTHER COMPLAINTS:  Nothing else    The following portions of the patient's history were reviewed and updated as appropriate:She  has a past medical history of Anxiety, Body piercing, Depression, Diabetes mellitus, Disease of thyroid gland, Elevated cholesterol, GERD (gastroesophageal reflux disease), Headache, History of bronchitis, History of chest pain, [place] (hard of hearing), Hyperlipidemia, Hypertension, Snores, Uses Frisian as primary spoken language, and Wears glasses.  She does not have any pertinent problems on file.  She  has a past surgical history that includes Knee surgery (Left); Ankle surgery (Left); Cardiac catheterization (2014); Eye surgery (Left); Other surgical history (Left); Nasal septum surgery; Tubal ligation; Vaginal delivery; Hernia repair (Right); and cholecystectomy with intraoperative cholangiogram (N/A, 2020).  Her family history is not on file.  She  reports that she quit smoking about 8 years ago. Her smoking use included cigarettes. She started smoking about 33 years ago. She has a 12.5 pack-year smoking history. She has never used smokeless tobacco. She reports that she does not drink alcohol and does not use drugs.  Current Outpatient Medications   Medication Sig Dispense Refill    acetaminophen (TYLENOL) 500 MG tablet Take 1 tablet by mouth Every 6 (Six) Hours As Needed for Mild Pain.      albuterol (PROVENTIL HFA;VENTOLIN HFA) 108 (90 Base) MCG/ACT inhaler Inhale 2 puffs Every 4 (Four) Hours As Needed for Shortness of Air. (Patient taking differently: Inhale 2 puffs Every 4 (Four) Hours  As Needed for Wheezing or Shortness of Air.) 1 inhaler 0    ASPIRIN LOW DOSE 81 MG EC tablet Take 1 tablet by mouth Daily.      atorvastatin (LIPITOR) 20 MG tablet Take 1 tablet by mouth Daily.      Benzocaine-Resorcinol (Vagisil) 5-2 % cream vaginal cream Insert  into the vagina 2 (Two) Times a Day As Needed (vaginal itching/irritation). 60 g 0    cyclobenzaprine (FLEXERIL) 10 MG tablet Take 0.5 tablets by mouth 2 (Two) Times a Day As Needed for Muscle Spasms for up to 16 doses. 8 tablet 0    glipiZIDE (GLUCOTROL) 10 MG tablet Take 1 tablet by mouth 2 (Two) Times a Day Before Meals.      HYDROcodone-acetaminophen (NORCO) 5-325 MG per tablet Take 1 tablet by mouth Every 6 (Six) Hours As Needed for Severe Pain. 10 tablet 0    hydrOXYzine (ATARAX) 25 MG tablet Take 1 tablet by mouth At Night As Needed for Anxiety.      levothyroxine (SYNTHROID, LEVOTHROID) 25 MCG tablet Take 1 tablet by mouth Daily.      magic mouthwash oral suspension Swish and spit 5 mL Every 4 (Four) Hours As Needed for Mucositis. 275 mL 0    MAGIC MOUTHWASH W/NYSTATIN 1/1/1/1 Swish and spit 5 mL 4 (Four) Times a Day. 300 mL 0    meloxicam (MOBIC) 15 MG tablet TOME 1 TABLETA POR BOCA CADA GENESIS      metFORMIN (GLUCOPHAGE) 1000 MG tablet Take 1 tablet by mouth 2 (Two) Times a Day With Meals.      pantoprazole (PROTONIX) 20 MG EC tablet       PARoxetine (PAXIL) 40 MG tablet Take 1 tablet by mouth Every Morning.      Tradjenta 5 MG tablet tablet TOME 1 TABLETA POR BOCA CADA GENESIS      triamcinolone (KENALOG) 0.5 % cream        No current facility-administered medications for this visit.     Current Outpatient Medications on File Prior to Visit   Medication Sig    acetaminophen (TYLENOL) 500 MG tablet Take 1 tablet by mouth Every 6 (Six) Hours As Needed for Mild Pain.    albuterol (PROVENTIL HFA;VENTOLIN HFA) 108 (90 Base) MCG/ACT inhaler Inhale 2 puffs Every 4 (Four) Hours As Needed for Shortness of Air. (Patient taking differently: Inhale 2 puffs Every 4  (Four) Hours As Needed for Wheezing or Shortness of Air.)    ASPIRIN LOW DOSE 81 MG EC tablet Take 1 tablet by mouth Daily.    atorvastatin (LIPITOR) 20 MG tablet Take 1 tablet by mouth Daily.    Benzocaine-Resorcinol (Vagisil) 5-2 % cream vaginal cream Insert  into the vagina 2 (Two) Times a Day As Needed (vaginal itching/irritation).    cyclobenzaprine (FLEXERIL) 10 MG tablet Take 0.5 tablets by mouth 2 (Two) Times a Day As Needed for Muscle Spasms for up to 16 doses.    glipiZIDE (GLUCOTROL) 10 MG tablet Take 1 tablet by mouth 2 (Two) Times a Day Before Meals.    HYDROcodone-acetaminophen (NORCO) 5-325 MG per tablet Take 1 tablet by mouth Every 6 (Six) Hours As Needed for Severe Pain.    hydrOXYzine (ATARAX) 25 MG tablet Take 1 tablet by mouth At Night As Needed for Anxiety.    levothyroxine (SYNTHROID, LEVOTHROID) 25 MCG tablet Take 1 tablet by mouth Daily.    magic mouthwash oral suspension Swish and spit 5 mL Every 4 (Four) Hours As Needed for Mucositis.    MAGIC MOUTHWASH W/NYSTATIN 1/1/1/1 Swish and spit 5 mL 4 (Four) Times a Day.    meloxicam (MOBIC) 15 MG tablet TOME 1 TABLETA POR BOCA CADA GENESIS    metFORMIN (GLUCOPHAGE) 1000 MG tablet Take 1 tablet by mouth 2 (Two) Times a Day With Meals.    pantoprazole (PROTONIX) 20 MG EC tablet     PARoxetine (PAXIL) 40 MG tablet Take 1 tablet by mouth Every Morning.    Tradjenta 5 MG tablet tablet TOME 1 TABLETA POR BOCA CADA GENESIS    triamcinolone (KENALOG) 0.5 % cream     [DISCONTINUED] lisinopril (PRINIVIL,ZESTRIL) 10 MG tablet Take 1 tablet by mouth Daily.    [DISCONTINUED] omeprazole (priLOSEC) 20 MG capsule TAKE ONE (1) CAPSULE BY MOUTH DAILY.      No current facility-administered medications on file prior to visit.     She has No Known Allergies.    Social History    Tobacco Use      Smoking status: Former        Packs/day: 0.00        Years: 0.5 packs/day for 25.0 years (12.5 ttl pk-yrs)        Types: Cigarettes        Start date: 1991        Quit date: 2016       "  Years since quittin.7      Smokeless tobacco: Never    Review of Systems  Consitutional POS: nothing reported    NEG: anorexia or night sweats   Gastointestinal POS: nothing reported    NEG: bloating, change in bowel habits, melena, or reflux symptoms   Genitourinary POS: nothing reported    NEG: dysuria or hematuria   Integument POS: nothing reported    NEG: moles that are changing in size, shape, color or rashes   Breast POS: nothing reported    NEG: persistent breast lump, skin dimpling, or nipple discharge         Respiratory: negative  Cardiovascular: negative  GYN:   see hpi          Objective   /60   Ht 162.6 cm (64\")   Wt 68.9 kg (151 lb 12.8 oz)   LMP  (LMP Unknown)   BMI 26.06 kg/m²     General:  well developed; well nourished  no acute distress  mentation appropriate   Skin:  No suspicious lesions seen   Thyroid: not examined   Lungs:  breathing is unlabored   Heart:  Not performed.   Breasts:  Not performed.   Abdomen: soft, non-tender; no masses  no umbilical or inguinal hernias are present  no hepato-splenomegaly   Pelvis: Clinical staff was present for exam  External genitalia:  normal appearance of the external genitalia including Bartholin's and [place]'s glands. Mild agglutinaton of the labia- no acteowhite changes     Psychiatric: Alert and oriented ×3, mood and affect appropriate  HEENT: Atraumatic, normocephalic, normal scleral icterus  Extremities: 2+ pulses bilaterally, no edema      Lab Review   No data reviewed    Imaging   No data reviewed        Assessment   [place] without warning signs     Plan   Clobetasol ointment 0.05% BID prn  F/u prn if doesn't help or if requires more and more of temovate    No orders of the defined types were placed in this encounter.         This note was electronically signed.      2024      " 0.02

## 2025-04-22 NOTE — H&P ADULT - ATTENDING COMMENTS
Opt out See PA note written above, for details. I reviewed the PA documentation.  I reviewed vitals, labs, medications, cardiac studies and additional imaging.  I agree with the PA's findings and plans as written above with the following additions/amendments:  Patient s/p cardiac cath 10/10/19 via radial access with MARZENA LM 75% in segment . Ostial LAD 95%. Patent LIMA-LAD. PTCA pLCx 75% ISR. Patent MARZENA in mRCA.   Plan for  Resume Eliquis on 10/11AM pending access site stability  Transition from pravastatin to high intensity statin Atorva 40. LDL goal <70  Metoprolol 25AM, 50PM  Imdur 30mg po daily for antianginal rx  HbA1c, FLP reviewed    ZABRINA Ryan.  Cardiology Attending